# Patient Record
Sex: MALE | Race: WHITE | NOT HISPANIC OR LATINO | Employment: FULL TIME | URBAN - METROPOLITAN AREA
[De-identification: names, ages, dates, MRNs, and addresses within clinical notes are randomized per-mention and may not be internally consistent; named-entity substitution may affect disease eponyms.]

---

## 2017-01-24 ENCOUNTER — OFFICE VISIT (OUTPATIENT)
Dept: NEUROSURGERY | Facility: CLINIC | Age: 57
End: 2017-01-24
Payer: COMMERCIAL

## 2017-01-24 ENCOUNTER — HOSPITAL ENCOUNTER (OUTPATIENT)
Dept: RADIOLOGY | Facility: HOSPITAL | Age: 57
Discharge: HOME | End: 2017-01-24
Attending: NEUROLOGICAL SURGERY
Payer: COMMERCIAL

## 2017-01-24 ENCOUNTER — OFFICE VISIT (OUTPATIENT)
Dept: ENDOCRINOLOGY | Facility: CLINIC | Age: 57
End: 2017-01-24
Payer: COMMERCIAL

## 2017-01-24 VITALS
SYSTOLIC BLOOD PRESSURE: 140 MMHG | HEART RATE: 59 BPM | DIASTOLIC BLOOD PRESSURE: 85 MMHG | BODY MASS INDEX: 45.54 KG/M2 | HEIGHT: 67 IN | WEIGHT: 290.13 LBS

## 2017-01-24 VITALS
BODY MASS INDEX: 45.54 KG/M2 | HEIGHT: 67 IN | SYSTOLIC BLOOD PRESSURE: 140 MMHG | WEIGHT: 290.13 LBS | HEART RATE: 59 BPM | DIASTOLIC BLOOD PRESSURE: 85 MMHG

## 2017-01-24 DIAGNOSIS — D35.2 PITUITARY MACROADENOMA: ICD-10-CM

## 2017-01-24 DIAGNOSIS — E89.3 S/P TRANSSPHENOIDAL HYPOPHYSECTOMY: Primary | ICD-10-CM

## 2017-01-24 DIAGNOSIS — D35.2 PITUITARY MACROADENOMA WITH EXTRASELLAR EXTENSION: ICD-10-CM

## 2017-01-24 DIAGNOSIS — I10 ESSENTIAL HYPERTENSION: ICD-10-CM

## 2017-01-24 DIAGNOSIS — E66.9 OBESITY, UNSPECIFIED OBESITY SEVERITY, UNSPECIFIED OBESITY TYPE: ICD-10-CM

## 2017-01-24 DIAGNOSIS — D35.2 PITUITARY MACROADENOMA: Primary | ICD-10-CM

## 2017-01-24 DIAGNOSIS — E23.0 HYPOGONADOTROPIC HYPOGONADISM: ICD-10-CM

## 2017-01-24 PROCEDURE — 70553 MRI BRAIN STEM W/O & W/DYE: CPT | Mod: 26,,, | Performed by: RADIOLOGY

## 2017-01-24 PROCEDURE — 25500020 PHARM REV CODE 255: Performed by: NEUROLOGICAL SURGERY

## 2017-01-24 PROCEDURE — 70553 MRI BRAIN STEM W/O & W/DYE: CPT | Mod: TC

## 2017-01-24 PROCEDURE — 99999 PR PBB SHADOW E&M-EST. PATIENT-LVL III: CPT | Mod: PBBFAC,,, | Performed by: NEUROLOGICAL SURGERY

## 2017-01-24 PROCEDURE — 3079F DIAST BP 80-89 MM HG: CPT | Mod: S$GLB,,, | Performed by: INTERNAL MEDICINE

## 2017-01-24 PROCEDURE — 99999 PR PBB SHADOW E&M-EST. PATIENT-LVL III: CPT | Mod: PBBFAC,,, | Performed by: INTERNAL MEDICINE

## 2017-01-24 PROCEDURE — 99214 OFFICE O/P EST MOD 30 MIN: CPT | Mod: S$GLB,,, | Performed by: INTERNAL MEDICINE

## 2017-01-24 PROCEDURE — 1159F MED LIST DOCD IN RCRD: CPT | Mod: S$GLB,,, | Performed by: NEUROLOGICAL SURGERY

## 2017-01-24 PROCEDURE — 3077F SYST BP >= 140 MM HG: CPT | Mod: S$GLB,,, | Performed by: INTERNAL MEDICINE

## 2017-01-24 PROCEDURE — A9585 GADOBUTROL INJECTION: HCPCS | Performed by: NEUROLOGICAL SURGERY

## 2017-01-24 PROCEDURE — 99213 OFFICE O/P EST LOW 20 MIN: CPT | Mod: S$GLB,,, | Performed by: NEUROLOGICAL SURGERY

## 2017-01-24 PROCEDURE — 1159F MED LIST DOCD IN RCRD: CPT | Mod: S$GLB,,, | Performed by: INTERNAL MEDICINE

## 2017-01-24 RX ORDER — GADOBUTROL 604.72 MG/ML
5 INJECTION INTRAVENOUS
Status: COMPLETED | OUTPATIENT
Start: 2017-01-24 | End: 2017-01-24

## 2017-01-24 RX ADMIN — GADOBUTROL 5 ML: 604.72 INJECTION INTRAVENOUS at 10:01

## 2017-01-24 NOTE — PROGRESS NOTES
Subjective:      Patient ID: Paddy Wong is a 56 y.o. male.    Chief Complaint: f/u     History of Present Illness      Pt is a 55 yo male who presents today for F/U. Pt has known pituitary tumor that was discovered during a MRI of the brain. Pt states he received a workup after experiencing a common cold in May 2015 losing both of his senses of smell and taste. He then FU with an ENT physician regarding his symptoms. In July 2015, he again FU with an ENT who ordered a MRI of the brain and later referred him to a Neurologist. The Neurologist then referred him to a Neurosurgeon, Opthalmologist, and an Endocrinologist (Dr. Teixeira). In January 2016, the pt was still experiencing loss in smell and some taste senses when eating certain foods. Pt denies any past head trauma or visual disturbance.    He had surgery on 2/10/16    No polyuria/polydipsia/nocturia  No dizziness   Headache from sinuses  No nausea vomitnig    No h/o hypothyroidism  Weight gain of 25 lbs before surgery  No heat or cold intolerance  no constipation /no diarrhea  no anxiety/ no tremors   no blurry vision / no chest pain/ no palpitations.    Denies erectile dysfunction  Does not get morning erections but satisfied with sexual function    His MRI from outside showed pituitary tumor (1.267 cm x 1.07 cm) located in sella of brain near the olfactory nerve.     No hyper or hyposecretion from pituitary        Review of Systems   Constitutional: Negative for unexpected weight change.   Eyes: Negative for visual disturbance.   Respiratory: Negative for shortness of breath.    Cardiovascular: Negative for chest pain.   Gastrointestinal: Negative for abdominal pain.   Musculoskeletal: Negative for myalgias.   Skin: Negative for wound.   Neurological: Negative for headaches.   Hematological: Does not bruise/bleed easily.   Psychiatric/Behavioral: Positive for sleep disturbance (snores).       Objective:   Physical Exam   Constitutional: No distress.  "  Eyes: Right eye exhibits no discharge. Left eye exhibits no discharge.   Pulmonary/Chest: Effort normal. No respiratory distress.   Neurological: He is alert.   Skin: He is not diaphoretic.        Vitals:    01/24/17 1155   BP: (!) 140/85   Pulse: (!) 59   Weight: 131.6 kg (290 lb 2 oz)   Height: 5' 7" (1.702 m)       Lab Review:     FRom 's note outside MRI   MRI of brain (outside disc) shows pituitary tumor (1.267 cm x 1.07 cm) located in sella of brain near the olfactory nerve . All sinus cavities are open.    TSS : 2/11/16    FINAL PATHOLOGIC DIAGNOSIS  [facility] DIAGNOSIS:  PITUITARY, SLIDES FOR REVIEW ([id number]; [facility], [place], [place];  COLLECTED 2/11/2016):  PITUITARY ADENOMA WITH GONADOTROPHIC DIFFERENTIATION, IMMUNOREACTIVE  FOR ALPHA-SUBUNIT AND SF1     Assessment:     Mr.Joseph Wong is here for 3 month follow up. 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  Started life style modifications   Started exercise   encourage to continue exercise   Also improvement in diet  Cut down hill   Cut down alcohol     HTN uncontrolled  - weight loss  He is refusing to start medication   F/u with PCP  Plan:     Follow up:     Recheck blood work at 8 AM near his home       "

## 2017-01-24 NOTE — PROGRESS NOTES
Subjective:   I, Lara Mayer, am scribing for, and in the presence of, Dr. Cali Reyes.     Patient ID: Paddy Wong is a 56 y.o. male.    Chief Complaint: No chief complaint on file.    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 The patient states that he is doing well. He is back in the gym and is lifting weights and working 4 days per week. He denies any new complaints today.    Patient was seen in multidisciplinary clinic with Dr. Booth. See Dr. Booth's note.     Review of Systems   Constitutional: Negative for activity change, fatigue and fever.   HENT: Negative for facial swelling.    Eyes: Negative.    Respiratory: Negative.    Cardiovascular: Negative.    Gastrointestinal: Negative for diarrhea, nausea and vomiting.   Genitourinary: Negative.    Musculoskeletal: Negative for back pain, joint swelling and myalgias.   Neurological: Negative for seizures, weakness, numbness and headaches.   Psychiatric/Behavioral: Negative.        Past Medical History   Diagnosis Date    Arthritis     Hyperlipidemia     Obesity     Pituitary adenoma        Objective:      Physical Exam   Constitutional: He is oriented to person, place, and time. He appears well-developed and well-nourished.   HENT:   Head: Normocephalic and atraumatic.   Neck: Neck supple.   Neurological: He is alert and oriented to person, place, and time. No cranial nerve deficit. He displays a negative Romberg sign. GCS eye subscore is 4. GCS verbal subscore is 5. GCS motor subscore is 6.       Imaging:  MRI of the brain, dated 1/24/2017, shows resection of the pituitary tumor without compression of the optic chiasm.    I have personally reviewed the images with the pt.      I, Dr. Cali Reyes, personally performed the services described in this documentation as scribed by Lara Mayer in my presence, and it is both accurate and complete.  Assessment:       Pituitary  tumor.    Plan:   I have reviewed the MRI of the brain with the patient, which shows resection of the pituitary tumor without compression of the optic chiasm. Dr. Booth will continue to follow the patient's hormonal panel. I will schedule him follow up in 1 year with repeat MRI of the brain.

## 2017-01-24 NOTE — PATIENT INSTRUCTIONS
I have reviewed the MRI of the brain with the patient, which shows resection of the pituitary tumor without compression of the optic chiasm. Dr. Booth will continue to follow the patient's hormonal panel. I will schedule him follow up in 1 year with repeat MRI of the brain.

## 2017-01-30 ENCOUNTER — LAB VISIT (OUTPATIENT)
Dept: LAB | Facility: HOSPITAL | Age: 57
End: 2017-01-30
Attending: INTERNAL MEDICINE
Payer: COMMERCIAL

## 2017-01-30 DIAGNOSIS — D35.2 PITUITARY MACROADENOMA WITH EXTRASELLAR EXTENSION: ICD-10-CM

## 2017-01-30 DIAGNOSIS — R53.83 OTHER FATIGUE: ICD-10-CM

## 2017-01-30 DIAGNOSIS — D35.2 PITUITARY MACROADENOMA: ICD-10-CM

## 2017-01-30 DIAGNOSIS — E89.3 S/P TRANSSPHENOIDAL HYPOPHYSECTOMY: ICD-10-CM

## 2017-01-30 LAB
ALBUMIN SERPL BCP-MCNC: 3.6 G/DL
ALP SERPL-CCNC: 83 U/L
ALT SERPL W/O P-5'-P-CCNC: 33 U/L
ANION GAP SERPL CALC-SCNC: 9 MMOL/L
AST SERPL-CCNC: 35 U/L
BILIRUB SERPL-MCNC: 0.7 MG/DL
BUN SERPL-MCNC: 14 MG/DL
CALCIUM SERPL-MCNC: 9.2 MG/DL
CHLORIDE SERPL-SCNC: 105 MMOL/L
CO2 SERPL-SCNC: 25 MMOL/L
CORTIS SERPL-MCNC: 13.2 UG/DL
CREAT SERPL-MCNC: 1 MG/DL
EST. GFR  (AFRICAN AMERICAN): >60 ML/MIN/1.73 M^2
EST. GFR  (NON AFRICAN AMERICAN): >60 ML/MIN/1.73 M^2
FSH SERPL-ACNC: 2.2 MIU/ML
FSH SERPL-ACNC: 2.2 MIU/ML
GLUCOSE SERPL-MCNC: 104 MG/DL
LH SERPL-ACNC: 1.7 MIU/ML
LH SERPL-ACNC: 1.7 MIU/ML
POTASSIUM SERPL-SCNC: 5.1 MMOL/L
PROLACTIN SERPL IA-MCNC: 10.9 NG/ML
PROT SERPL-MCNC: 7.6 G/DL
SODIUM SERPL-SCNC: 139 MMOL/L
T4 FREE SERPL-MCNC: 0.76 NG/DL
TESTOST SERPL-MCNC: 272 NG/DL
TSH SERPL DL<=0.005 MIU/L-ACNC: 1.94 UIU/ML

## 2017-01-30 PROCEDURE — 84403 ASSAY OF TOTAL TESTOSTERONE: CPT | Mod: 91

## 2017-01-30 PROCEDURE — 84270 ASSAY OF SEX HORMONE GLOBUL: CPT

## 2017-01-30 PROCEDURE — 84439 ASSAY OF FREE THYROXINE: CPT

## 2017-01-30 PROCEDURE — 82024 ASSAY OF ACTH: CPT

## 2017-01-30 PROCEDURE — 84146 ASSAY OF PROLACTIN: CPT

## 2017-01-30 PROCEDURE — 83002 ASSAY OF GONADOTROPIN (LH): CPT

## 2017-01-30 PROCEDURE — 82533 TOTAL CORTISOL: CPT

## 2017-01-30 PROCEDURE — 80053 COMPREHEN METABOLIC PANEL: CPT

## 2017-01-30 PROCEDURE — 84305 ASSAY OF SOMATOMEDIN: CPT

## 2017-01-30 PROCEDURE — 83001 ASSAY OF GONADOTROPIN (FSH): CPT

## 2017-01-30 PROCEDURE — 84443 ASSAY THYROID STIM HORMONE: CPT

## 2017-01-30 PROCEDURE — 30000890 MAYO MISCELLANEOUS TEST (REFLEX)

## 2017-01-31 LAB — ACTH PLAS-MCNC: 29 PG/ML

## 2017-02-02 LAB — MAYO MISCELLANEOUS RESULT (REF): NORMAL

## 2017-02-03 ENCOUNTER — TELEPHONE (OUTPATIENT)
Dept: ENDOCRINOLOGY | Facility: CLINIC | Age: 57
End: 2017-02-03

## 2017-02-03 DIAGNOSIS — R79.89 LOW TESTOSTERONE: Primary | ICD-10-CM

## 2017-02-03 NOTE — TELEPHONE ENCOUNTER
----- Message from Ela Roberts sent at 2/3/2017  3:46 PM CST -----  David this is Ela from the send out lab. I'm contacting you about the testosterone panel test cancellation. The sample was not enough quantity to test. Please confirm you have reviewed this message. Thanks.

## 2017-02-07 ENCOUNTER — PATIENT MESSAGE (OUTPATIENT)
Dept: ENDOCRINOLOGY | Facility: CLINIC | Age: 57
End: 2017-02-07

## 2017-02-07 ENCOUNTER — TELEPHONE (OUTPATIENT)
Dept: ENDOCRINOLOGY | Facility: CLINIC | Age: 57
End: 2017-02-07

## 2017-02-07 NOTE — TELEPHONE ENCOUNTER
----- Message from Kaylynn Albert sent at 2/7/2017  9:47 AM CST -----  Contact: Bruce / Rolanda  [place]s [place] bruce at [phone] in regards to a test cancellation.

## 2017-06-13 ENCOUNTER — OFFICE VISIT (OUTPATIENT)
Dept: FAMILY MEDICINE | Facility: CLINIC | Age: 57
End: 2017-06-13
Payer: COMMERCIAL

## 2017-06-13 VITALS
BODY MASS INDEX: 44.47 KG/M2 | OXYGEN SATURATION: 97 % | WEIGHT: 293.44 LBS | SYSTOLIC BLOOD PRESSURE: 128 MMHG | DIASTOLIC BLOOD PRESSURE: 74 MMHG | HEART RATE: 77 BPM | HEIGHT: 68 IN

## 2017-06-13 DIAGNOSIS — K21.9 GASTROESOPHAGEAL REFLUX DISEASE, ESOPHAGITIS PRESENCE NOT SPECIFIED: ICD-10-CM

## 2017-06-13 DIAGNOSIS — R19.15 DECREASED BOWEL SOUNDS: ICD-10-CM

## 2017-06-13 DIAGNOSIS — R19.7 DIARRHEA, UNSPECIFIED TYPE: ICD-10-CM

## 2017-06-13 DIAGNOSIS — K44.9 HIATAL HERNIA: ICD-10-CM

## 2017-06-13 DIAGNOSIS — R10.13 ACUTE EPIGASTRIC PAIN: Primary | ICD-10-CM

## 2017-06-13 DIAGNOSIS — R30.0 DYSURIA: ICD-10-CM

## 2017-06-13 DIAGNOSIS — E66.01 MORBID OBESITY DUE TO EXCESS CALORIES: ICD-10-CM

## 2017-06-13 LAB
BILIRUB UR QL STRIP: NEGATIVE
CLARITY UR: CLEAR
COLOR UR: YELLOW
GLUCOSE UR QL STRIP: NEGATIVE
HGB UR QL STRIP: NEGATIVE
KETONES UR QL STRIP: NEGATIVE
LEUKOCYTE ESTERASE UR QL STRIP: NEGATIVE
NITRITE UR QL STRIP: NEGATIVE
PH UR STRIP: 6 [PH] (ref 5–8)
PROT UR QL STRIP: NEGATIVE
SP GR UR STRIP: 1.01 (ref 1–1.03)
URN SPEC COLLECT METH UR: ABNORMAL
UROBILINOGEN UR STRIP-ACNC: ABNORMAL EU/DL

## 2017-06-13 PROCEDURE — 99214 OFFICE O/P EST MOD 30 MIN: CPT | Mod: S$GLB,,, | Performed by: NURSE PRACTITIONER

## 2017-06-13 PROCEDURE — 87086 URINE CULTURE/COLONY COUNT: CPT

## 2017-06-13 PROCEDURE — 81002 URINALYSIS NONAUTO W/O SCOPE: CPT | Mod: PO

## 2017-06-13 PROCEDURE — 99999 PR PBB SHADOW E&M-EST. PATIENT-LVL IV: CPT | Mod: PBBFAC,,, | Performed by: NURSE PRACTITIONER

## 2017-06-13 NOTE — PROGRESS NOTES
Subjective:       Patient ID: Paddy Wong is a 56 y.o. male.    Chief Complaint: Abdominal Pain    Abdominal Pain   This is a new problem. The current episode started 1 to 4 weeks ago. The onset quality is undetermined. The problem occurs constantly. The problem has been unchanged. The pain is located in the epigastric region. The pain is at a severity of 3/10. The pain is mild. The quality of the pain is aching. The abdominal pain does not radiate. The pain is aggravated by certain positions. The pain is relieved by nothing. He has tried proton pump inhibitors and H2 blockers (Protonix, Nexium, Zantac) for the symptoms. The treatment provided no relief. His past medical history is significant for GERD.     Review of Systems   Gastrointestinal: Positive for abdominal pain.        Loose stools, acid reflux   All other systems reviewed and are negative.      Objective:      Physical Exam   Constitutional: He is oriented to person, place, and time. He appears well-developed. No distress.   Morbidly obese   HENT:   Head: Normocephalic and atraumatic.   Eyes: EOM are normal. Pupils are equal, round, and reactive to light.   Neck: Neck supple. No JVD present. No tracheal deviation present.   Cardiovascular: Normal rate, regular rhythm, normal heart sounds and intact distal pulses.    No murmur heard.  Pulmonary/Chest: Effort normal and breath sounds normal. No respiratory distress. He has no wheezes. He has no rales.   Abdominal: Soft. Normal appearance and normal aorta. He exhibits no distension and no mass. Bowel sounds are decreased. There is no tenderness. There is no rebound, no guarding, no CVA tenderness and no tenderness at McBurney's point.   Musculoskeletal: Normal range of motion. He exhibits no edema or tenderness.   Neurological: He is alert and oriented to person, place, and time. Coordination normal.   Skin: Skin is warm and dry. No erythema. No pallor.   Psychiatric: He has a normal mood and affect.  His behavior is normal. Judgment and thought content normal. Cognition and memory are normal. He expresses no homicidal and no suicidal ideation.   Nursing note and vitals reviewed.      Assessment:       1. Acute epigastric pain    2. Decreased bowel sounds    3. Diarrhea, unspecified type    4. Dysuria    5. Gastroesophageal reflux disease, esophagitis presence not specified    6. Hiatal hernia    7. Morbid obesity due to excess calories        Plan:     Paddy was seen today for abdominal pain.    Diagnoses and all orders for this visit:    Acute epigastric pain  -     US Abdomen Complete; Future    Decreased bowel sounds    Diarrhea, unspecified type    Dysuria  -     Urinalysis  -     Urine culture    Gastroesophageal reflux disease, esophagitis presence not specified    Hiatal hernia    Morbid obesity due to excess calories  Chronic, stable. Therapeutic lifestyle changes discussed. Followed by PCP.      Follow-up with PCP if symptoms worsen or fail to improve.

## 2017-06-14 ENCOUNTER — TELEPHONE (OUTPATIENT)
Dept: FAMILY MEDICINE | Facility: CLINIC | Age: 57
End: 2017-06-14

## 2017-06-14 ENCOUNTER — HOSPITAL ENCOUNTER (OUTPATIENT)
Dept: RADIOLOGY | Facility: HOSPITAL | Age: 57
Discharge: HOME | End: 2017-06-14
Attending: NURSE PRACTITIONER
Payer: COMMERCIAL

## 2017-06-14 ENCOUNTER — TELEPHONE (OUTPATIENT)
Dept: INTERNAL MEDICINE | Facility: CLINIC | Age: 57
End: 2017-06-14

## 2017-06-14 DIAGNOSIS — R10.13 ACUTE EPIGASTRIC PAIN: ICD-10-CM

## 2017-06-14 PROBLEM — K76.0 FATTY LIVER: Status: ACTIVE | Noted: 2017-06-14

## 2017-06-14 PROCEDURE — 76700 US EXAM ABDOM COMPLETE: CPT | Mod: 26,,, | Performed by: RADIOLOGY

## 2017-06-14 PROCEDURE — 76700 US EXAM ABDOM COMPLETE: CPT | Mod: TC

## 2017-06-14 NOTE — TELEPHONE ENCOUNTER
----- Message from Aditi Wells sent at 6/14/2017  8:20 AM CDT -----  Contact: self/[phone]  He is calling to see if his ultrasound has been scheduled for this morning; he cannot fast all day.

## 2017-06-15 LAB — BACTERIA UR CULT: NO GROWTH

## 2017-06-28 ENCOUNTER — OFFICE VISIT (OUTPATIENT)
Dept: SLEEP MEDICINE | Facility: CLINIC | Age: 57
End: 2017-06-28
Payer: COMMERCIAL

## 2017-06-28 VITALS
BODY MASS INDEX: 45.67 KG/M2 | DIASTOLIC BLOOD PRESSURE: 74 MMHG | HEIGHT: 67 IN | HEART RATE: 77 BPM | WEIGHT: 291 LBS | SYSTOLIC BLOOD PRESSURE: 128 MMHG

## 2017-06-28 DIAGNOSIS — G47.30 SLEEP APNEA, UNSPECIFIED TYPE: Primary | ICD-10-CM

## 2017-06-28 PROCEDURE — 99204 OFFICE O/P NEW MOD 45 MIN: CPT | Mod: S$GLB,,, | Performed by: PSYCHIATRY & NEUROLOGY

## 2017-06-28 PROCEDURE — 99999 PR PBB SHADOW E&M-EST. PATIENT-LVL III: CPT | Mod: PBBFAC,,, | Performed by: PSYCHIATRY & NEUROLOGY

## 2017-06-28 NOTE — LETTER
June 29, 2017      Carlota Booth MD  [address]  [facility] [address]           [facility]  [address] [facility] [address]  Phone: [phone]  Fax: [phone]          Patient: Paddy Wong   MR Number: [id number]   YOB: 1960   Date of Visit: 6/28/2017       Dear Dr. Carlota Booth:    Thank you for referring Paddy Wong to me for evaluation. Attached you will find relevant portions of my assessment and plan of care.    If you have questions, please do not hesitate to call me. I look forward to following Paddy Wong along with you.    Sincerely,    Ericka Chaves MD    Enclosure  CC:  No Recipients    If you would like to receive this communication electronically, please contact [email or web address] or [phone] to request more information on [employer] access.    For providers and/or their staff who would like to refer a patient to [facility], please contact us through our one-stop-shop provider referral line, [facility], [place], at [phone].    If you feel you have received this communication in error or would no longer like to receive these types of communications, please e-mail [email or web address]

## 2017-06-28 NOTE — PATIENT INSTRUCTIONS
SLEEP LAB ([facility]) will contact you to schedulethe sleep study. Their number is [phone] (ext 1). Please call them if you do not hear from them in 10 business days from now.  The [facility] Sleep Lab is located on 7th floor of the [facility]; [facility] is located in [facility].    SLEEP CLINIC (my assistant) will call you when the sleep study results are ready - if you have not heard from us by 2 weeks from the date of the study, please call [phone] (ext 2) or you can use [facility] to contact me.    You are advised to abstain from driving should you feel sleepy or drowsy.

## 2017-06-28 NOTE — PROGRESS NOTES
Paddy Wong  was seen at the request of  Carlota Booth MD for sleep evaluation.    06/28/2017 INITIAL HISTORY OF PRESENT ILLNESS:  Paddy Wong is a 56 y.o. male is here to be evaluated for a sleep disorder.       CHIEF COMPLAINT:      The patient's complaints include excessive daytime sleepiness, excessive daytime fatigue, snoring and interrupted sleep since  Several yeasrs ago.    Denies  dry mouth and sore throat  Reports occasional nasal congestion   Reports  morning headaches  Denies  interrupted sleep  Denies frequent leg movements  Denies symptoms concerning for parasomnia    The ESS ([place] Sleepiness Score) taken on initial visit is 5 /24    The patient never had tonsillectomy, adenoidectomy or UPPP      SLEEP ROUTINE AND LIFESTYLE 06/28/2017 :    Occupation:emergency response for [employer]    Bed partner:      Time to bed - wake up time on a workday : 11-12 to 6 AM  Time to bed - wake up time on a day off: 11-12 to  7 AM or 10  Sleep onset latency: 30 to 60 min  Disruptions or awakenings: at least 4 - sometimes with his fingers tingling ([id number] to sleeping on his side and stomach)  Time to fall back into sleep: soon   Perceived sleep quality: 2/5  Perceived total sleep time:  4-5  hours.  Daytime naps: 1    Exercise routine: +,but no longer playing ball  Caffeine: Not coffee; no ETOH at night     PREVIOUS SLEEP STUDIES:     none      DME:       PAST MEDICAL HISTORY:    Active Ambulatory Problems     Diagnosis Date Noted    Pituitary macroadenoma with extrasellar extension 12/08/2015    Pituitary macroadenoma 02/11/2016    S/P transsphenoidal hypophysectomy 01/24/2017    Hypogonadotropic hypogonadism 01/24/2017    Morbid obesity due to excess calories 01/24/2017    HTN (hypertension) 01/24/2017    Hiatal hernia 06/13/2017    GERD (gastroesophageal reflux disease) 06/13/2017    Fatty liver 06/14/2017     Resolved Ambulatory Problems     Diagnosis Date Noted    No Resolved Ambulatory  Problems     Past Medical History:   Diagnosis Date    Arthritis     HTN (hypertension) 1/24/2017    Hyperlipidemia     Obesity     Pituitary adenoma                 PAST SURGICAL HISTORY:    Past Surgical History:   Procedure Laterality Date    BRAIN SURGERY  2/11/16    PITUITARY TUMMOR RESECTION/WARE    FINGER AMPUTATION      KNEE ARTHROPLASTY      SHOULDER ARTHROSCOPY      TONSILLECTOMY           FAMILY HISTORY:                Family History   Problem Relation Age of Onset    Arthritis Mother     Diabetes Mother     Hypertension Mother     Arthritis Father     COPD Father     Diabetes Father     Hearing loss Father     Heart disease Father     Diabetes Sister     Hypertension Sister     Early death Brother     No Known Problems Maternal Aunt     No Known Problems Maternal Uncle     No Known Problems Paternal Aunt     No Known Problems Paternal Uncle     No Known Problems Maternal Grandmother     No Known Problems Maternal Grandfather     No Known Problems Paternal Grandmother     No Known Problems Paternal Grandfather     Amblyopia Neg Hx     Blindness Neg Hx     Cancer Neg Hx     Cataracts Neg Hx     Glaucoma Neg Hx     Macular degeneration Neg Hx     Retinal detachment Neg Hx     Strabismus Neg Hx     Stroke Neg Hx     Thyroid disease Neg Hx        SOCIAL HISTORY:          Tobacco:   History   Smoking Status    Light Tobacco Smoker    Packs/day: 0.25    Years: 15.00   Smokeless Tobacco    Not on file       alcohol use:    History   Alcohol Use    1.8 oz/week    1 Glasses of wine, 1 Cans of beer, 1 Shots of liquor per week                   ALLERGIES:    Review of patient's allergies indicates:   Allergen Reactions    Morphine Nausea And Vomiting       CURRENT MEDICATIONS:    Current Outpatient Prescriptions   Medication Sig Dispense Refill    pantoprazole (PROTONIX) 40 MG tablet Take 1 tablet (40 mg total) by mouth once daily. 30 tablet 3     No current  "facility-administered medications for this visit.                       REVIEW OF SYSTEMS:   Sleep related symptoms as per HPI    reports weight gain since 2011  Denies dyspnea  Denies palpitations  Reports occasional acid reflux   Denies polyuria x 1  Denies  mood diturbance  Denies  anemia  Denies  muscle pain  Denies  Gait imbalance    Otherwise, a balance of 10 systems reviewed is negative.    PHYSICAL EXAM:  Ht 5' 7" (1.702 m)   Wt 132 kg (291 lb 0.1 oz)   BMI 45.58 kg/m²   GENERAL: Overweight body habitus, well groomed.  HEENT:   HEENT:  Conjunctivae are non-erythematous; Pupils equal, round, and reactive to light; Nose is symmetrical; Nasal mucosa is pink and moist; Septum is midline; Inferior turbinates are hypertrophied; Nasal airflow is diminshed; Posterior pharynx is pink; Modified Mallampati:III-IV; Posterior palate is low; Tonsils not visualized; Uvula is normal and pink;Tongue is enlarged; Dentition is fair; No TMJ tenderness; Jaw opening and protrusion without click and without discomfort.  NECK: Supple. Neck circumference is 20 inches. No thyromegaly. No palpable nodes.     SKIN: On face and neck: No abrasions, no rashes, no lesions.  No subcutaneous nodules are palpable.  RESPIRATORY: Chest is clear to auscultation.  Normal chest expansion and non-labored breathing at rest.  CARDIOVASCULAR: Normal S1, S2.  No murmurs, gallops or rubs. No carotid bruits bilaterally.  No edema. No clubbing. No cyanosis.    NEURO: Oriented to time, place and person. Normal attention span and concentration. Gait normal.    PSYCH: Affect is full. Mood is normal  MUSCULOSKELETAL: Moves 4 extremities. Gait normal.         Using My [facility]:       ASSESSMENT:    1. Sleep Apnea NEC. The patient symptomatically has  excessive daytime sleepiness, snoring, excessive daytime fatigue and interrupted sleep  with exam findings of "a crowded oral airway and elevated body mass index. The patient has medical co-morbidities of " hypertension,  which can be worsened by LILIYA. This warrants further investigation for possible obstructive sleep apnea.          PLAN:    Diagnostic: Polysomnogram in lab (BMI over 45 - risk for obesity hypoventilation). The nature of this procedure and its indication was discussed with the patient. he would  like to come discuss PSG results.    Weight loss strategies were discussed in detail       More than 25 minutes of this 45 minutes visit was spent in counseling: during our discussion today, we talked about the etiology of  LILIYA as well as the potential ramifications of untreated sleep apnea, which could include daytime sleepiness, hypertension, heart disease and/or stroke.  We discussed potential treatment options, which could include weight loss, body positioning, continuous positive airway pressure (CPAP), or referral for surgical consideration. Meanwhile, he  is urged to avoid supine sleep, weight gain and alcoholic beverages since all of these can worsen LILIYA.     Precautions: The patient was advised to abstain from driving should he feel sleepy or drowsy.    Follow up: MD/NP  after the sleep study has been completed.     Thank you for allowing me the opportunity to participate in the care of your patient.    This visit summary will be sent to referring provider via inbasket

## 2017-07-05 ENCOUNTER — TELEPHONE (OUTPATIENT)
Dept: SLEEP MEDICINE | Facility: CLINIC | Age: 57
End: 2017-07-05

## 2017-07-28 ENCOUNTER — TELEPHONE (OUTPATIENT)
Dept: SLEEP MEDICINE | Facility: CLINIC | Age: 57
End: 2017-07-28

## 2017-07-28 DIAGNOSIS — G47.30 SLEEP APNEA, UNSPECIFIED TYPE: Primary | ICD-10-CM

## 2017-07-28 NOTE — TELEPHONE ENCOUNTER
Joselyn,    Please inform the patient of the following:      Thanks!    Unfortunately, your insurance denied in lab sleep study.  Please see the letter below.  We will have to go to plan B and order a Home Study for you.  Please keep in mind that it is far inferior to in lab in terms of sensitivity, and negative home study does not mean that you do not have Sleep Apnea Disorder.    SLEEP LAB ( [facility]) will [place]. Their number is [phone] (ext 1). Please call them if you do not hear from them in 10 business days from now.  The [facility] Sleep Lab is located on 7th floor of the [facility] where you can  home study device.    SLEEP CLINIC (my assistant) will call you when the sleep study results are ready - if you have not heard from us by 2 weeks from the date of the study, please call [phone] (ext 2) or you can use [facility] to contact me.    You are advised to abstain from driving should you feel sleepy or drowsy.  ----------------------------------------------------------------------------------------------------                  ---------------------------------------------  Dr. Ericka Chaves,     Thank you for ordering   SLE3 - POLYSOMNOGRAM for patient Paddy Wong, MRN [id number]. Unfortunately, the patient's insurance, [employer], has denied the service due to Other, N/A. This is an automated In Basket notification that does not require a reply. For a more detailed explanation, or for questions regarding this insurance denial, send an In Basket message to the Pre Service Intake pool or call the [facility] Pre-Service department at [phone] and reference referral ID [id number].The Pre-Service department hours are M-F 8 a.m. to 5 p.m.       Thank you,     [facility]

## 2017-08-01 ENCOUNTER — TELEPHONE (OUTPATIENT)
Dept: SLEEP MEDICINE | Facility: OTHER | Age: 57
End: 2017-08-01

## 2017-09-18 ENCOUNTER — TELEPHONE (OUTPATIENT)
Dept: SLEEP MEDICINE | Facility: OTHER | Age: 57
End: 2017-09-18

## 2017-10-18 ENCOUNTER — TELEPHONE (OUTPATIENT)
Dept: SLEEP MEDICINE | Facility: OTHER | Age: 57
End: 2017-10-18

## 2017-11-06 RX ORDER — PANTOPRAZOLE SODIUM 40 MG/1
40 TABLET, DELAYED RELEASE ORAL DAILY
Qty: 30 TABLET | Refills: 2 | OUTPATIENT
Start: 2017-11-06

## 2017-11-13 ENCOUNTER — TELEPHONE (OUTPATIENT)
Dept: SLEEP MEDICINE | Facility: OTHER | Age: 57
End: 2017-11-13

## 2018-06-11 ENCOUNTER — TELEPHONE (OUTPATIENT)
Dept: NEUROSURGERY | Facility: CLINIC | Age: 58
End: 2018-06-11

## 2018-06-11 ENCOUNTER — PATIENT MESSAGE (OUTPATIENT)
Dept: NEUROSURGERY | Facility: CLINIC | Age: 58
End: 2018-06-11

## 2018-06-11 DIAGNOSIS — D35.2 PITUITARY ADENOMA: Primary | ICD-10-CM

## 2018-06-21 ENCOUNTER — OFFICE VISIT (OUTPATIENT)
Dept: ENDOCRINOLOGY | Facility: CLINIC | Age: 58
End: 2018-06-21
Payer: COMMERCIAL

## 2018-06-21 VITALS
WEIGHT: 308.19 LBS | DIASTOLIC BLOOD PRESSURE: 80 MMHG | HEART RATE: 84 BPM | HEIGHT: 67 IN | SYSTOLIC BLOOD PRESSURE: 124 MMHG | BODY MASS INDEX: 48.37 KG/M2

## 2018-06-21 DIAGNOSIS — H53.8 BLURRY VISION, RIGHT EYE: ICD-10-CM

## 2018-06-21 DIAGNOSIS — D35.2 PITUITARY MACROADENOMA: Primary | ICD-10-CM

## 2018-06-21 DIAGNOSIS — E66.01 MORBID OBESITY WITH BMI OF 45.0-49.9, ADULT: ICD-10-CM

## 2018-06-21 DIAGNOSIS — R53.82 CHRONIC FATIGUE: ICD-10-CM

## 2018-06-21 PROCEDURE — 3074F SYST BP LT 130 MM HG: CPT | Mod: CPTII,S$GLB,, | Performed by: INTERNAL MEDICINE

## 2018-06-21 PROCEDURE — 99999 PR PBB SHADOW E&M-EST. PATIENT-LVL IV: CPT | Mod: PBBFAC,,, | Performed by: INTERNAL MEDICINE

## 2018-06-21 PROCEDURE — 3008F BODY MASS INDEX DOCD: CPT | Mod: CPTII,S$GLB,, | Performed by: INTERNAL MEDICINE

## 2018-06-21 PROCEDURE — 3079F DIAST BP 80-89 MM HG: CPT | Mod: CPTII,S$GLB,, | Performed by: INTERNAL MEDICINE

## 2018-06-21 PROCEDURE — 99214 OFFICE O/P EST MOD 30 MIN: CPT | Mod: S$GLB,,, | Performed by: INTERNAL MEDICINE

## 2018-06-21 RX ORDER — PANTOPRAZOLE SODIUM 40 MG/1
40 TABLET, DELAYED RELEASE ORAL DAILY
Qty: 30 TABLET | Refills: 3 | Status: CANCELLED | OUTPATIENT
Start: 2018-06-21

## 2018-06-21 NOTE — PROGRESS NOTES
"Subjective:      Patient ID: Paddy Wong is a 57 y.o. male.    Chief Complaint: follow-up of pituitary macroadenoma   is presenting for follow up of pituitary macroadenoma.    Former patient of Dr. Booth for pituitary macroadenoma.    Transphenoidal surgery in 2/2016 which demonstrated gonadotrope produing adenoma.  Last mri brain from 1/2017  :  There are postoperative changes consistent with prior transphenoidal approach resection of a pituitary lesion.  There is residual enhancement along the floor of the sella measuring approximately 4 mm in thickness, which may represent residual pituitary parenchyma or adenoma. No new focal nodular growth.  The infundibulum is slightly leftward deviated. No significant suprasellar mass effect  The optic chiasm is within normal limits.  There is continued complete opacification of the left sphenoid sinus which is heterogenous in signal.  Limited examination of the brain demonstrates no focal parenchymal abnormality.  Ventricles are stable in size without evidence for hydrocephalus.  No midline shift or mass effect.  Osseous marrow signal is within normal limits.    Current symptoms including fatigue, right blurry eye vision for 3 weeks which has not been progressive, and weight gain.  Denies peripheral field deficits  Had previously seen Dr. Moseley with Opthalmology     Weight gain - at least 10lbs in several years. bmi at 48    Review of Systems   Constitutional: Positive for fatigue and unexpected weight change.   Eyes: Positive for visual disturbance.   Respiratory: Negative for shortness of breath.    Cardiovascular: Negative for chest pain.   Gastrointestinal: Negative for abdominal pain.   Musculoskeletal: Negative for myalgias.   Skin: Negative for wound.   Neurological: Negative for headaches.   Hematological: Does not bruise/bleed easily.   Psychiatric/Behavioral: Negative for sleep disturbance.       Objective:     /80   Pulse 84   Ht 5' 7" " (1.702 m)   Wt (!) 139.8 kg (308 lb 3.3 oz)   BMI 48.27 kg/m²      Physical Exam   Neck: No thyromegaly present.   Cardiovascular: Normal rate.    Pulmonary/Chest: Effort normal.   Abdominal: Soft.   Musculoskeletal: He exhibits no edema.   Vitals reviewed.      Assessment:     1. Pituitary macroadenoma    2. Morbid obesity with BMI of 45.0-49.9, adult    3. Blurry vision, right eye    4. Chronic fatigue        Plan:   1. Repeat hormonal evaluation and MRI scheduled in the next 2 weeks.  2. Referral to bariatric medicine. Would be interested eventually in weight loss surgery if that was an option financially.  3. Referral to opthalmology.  4. Will evaluate for hypogonadism, hypothyroidism, and adrenal insufficiency. If work-up is unremarkable, would recommend evaluation for sleep apnea.  Follow-up in about 1 year (around 6/21/2019).    Discussed with Dr. Rosa Felix MD

## 2018-06-26 ENCOUNTER — LAB VISIT (OUTPATIENT)
Dept: LAB | Facility: HOSPITAL | Age: 58
End: 2018-06-26
Attending: NEUROLOGICAL SURGERY
Payer: COMMERCIAL

## 2018-06-26 DIAGNOSIS — E66.01 MORBID OBESITY WITH BMI OF 45.0-49.9, ADULT: ICD-10-CM

## 2018-06-26 DIAGNOSIS — D35.2 PITUITARY ADENOMA: ICD-10-CM

## 2018-06-26 DIAGNOSIS — D35.2 PITUITARY MACROADENOMA: ICD-10-CM

## 2018-06-26 LAB
ALBUMIN SERPL BCP-MCNC: 3.9 G/DL
ALP SERPL-CCNC: 88 U/L
ALT SERPL W/O P-5'-P-CCNC: 41 U/L
ANION GAP SERPL CALC-SCNC: 10 MMOL/L
AST SERPL-CCNC: 29 U/L
BILIRUB SERPL-MCNC: 0.9 MG/DL
BUN SERPL-MCNC: 12 MG/DL
CALCIUM SERPL-MCNC: 9.6 MG/DL
CHLORIDE SERPL-SCNC: 108 MMOL/L
CO2 SERPL-SCNC: 25 MMOL/L
CORTIS SERPL-MCNC: 7 UG/DL
CREAT SERPL-MCNC: 0.9 MG/DL
EST. GFR  (AFRICAN AMERICAN): >60 ML/MIN/1.73 M^2
EST. GFR  (NON AFRICAN AMERICAN): >60 ML/MIN/1.73 M^2
ESTIMATED AVG GLUCOSE: 103 MG/DL
FSH SERPL-ACNC: 2.2 MIU/ML
GLUCOSE SERPL-MCNC: 108 MG/DL
HBA1C MFR BLD HPLC: 5.2 %
LH SERPL-ACNC: 1.9 MIU/ML
POTASSIUM SERPL-SCNC: 4.3 MMOL/L
PROLACTIN SERPL IA-MCNC: 8.4 NG/ML
PROT SERPL-MCNC: 7.3 G/DL
SODIUM SERPL-SCNC: 143 MMOL/L
T4 FREE SERPL-MCNC: 0.78 NG/DL
TESTOST SERPL-MCNC: 256 NG/DL
TSH SERPL DL<=0.005 MIU/L-ACNC: 1.85 UIU/ML

## 2018-06-26 PROCEDURE — 84439 ASSAY OF FREE THYROXINE: CPT

## 2018-06-26 PROCEDURE — 82533 TOTAL CORTISOL: CPT

## 2018-06-26 PROCEDURE — 83002 ASSAY OF GONADOTROPIN (LH): CPT

## 2018-06-26 PROCEDURE — 82024 ASSAY OF ACTH: CPT

## 2018-06-26 PROCEDURE — 84146 ASSAY OF PROLACTIN: CPT

## 2018-06-26 PROCEDURE — 80053 COMPREHEN METABOLIC PANEL: CPT

## 2018-06-26 PROCEDURE — 84443 ASSAY THYROID STIM HORMONE: CPT

## 2018-06-26 PROCEDURE — 83001 ASSAY OF GONADOTROPIN (FSH): CPT

## 2018-06-26 PROCEDURE — 84305 ASSAY OF SOMATOMEDIN: CPT

## 2018-06-26 PROCEDURE — 83036 HEMOGLOBIN GLYCOSYLATED A1C: CPT

## 2018-06-26 PROCEDURE — 84403 ASSAY OF TOTAL TESTOSTERONE: CPT

## 2018-06-28 ENCOUNTER — INITIAL CONSULT (OUTPATIENT)
Dept: OPTOMETRY | Facility: CLINIC | Age: 58
End: 2018-06-28
Payer: COMMERCIAL

## 2018-06-28 DIAGNOSIS — H52.4 PRESBYOPIA: ICD-10-CM

## 2018-06-28 DIAGNOSIS — H52.221 REGULAR ASTIGMATISM OF RIGHT EYE: ICD-10-CM

## 2018-06-28 DIAGNOSIS — D35.2 PITUITARY MACROADENOMA: ICD-10-CM

## 2018-06-28 DIAGNOSIS — I10 ESSENTIAL HYPERTENSION: Primary | ICD-10-CM

## 2018-06-28 DIAGNOSIS — H25.13 NS (NUCLEAR SCLEROSIS), BILATERAL: ICD-10-CM

## 2018-06-28 LAB
IGF-I SERPL-MCNC: 94 NG/ML (ref 34–232)
IGF-I Z-SCORE SERPL: -0.52 SD

## 2018-06-28 PROCEDURE — 99999 PR PBB SHADOW E&M-EST. PATIENT-LVL II: CPT | Mod: PBBFAC,,, | Performed by: OPTOMETRIST

## 2018-06-28 PROCEDURE — 92014 COMPRE OPH EXAM EST PT 1/>: CPT | Mod: S$GLB,,, | Performed by: OPTOMETRIST

## 2018-06-28 PROCEDURE — 92015 DETERMINE REFRACTIVE STATE: CPT | Mod: S$GLB,,, | Performed by: OPTOMETRIST

## 2018-06-28 NOTE — PROGRESS NOTES
"HPI     57yr old male present for consult due to decreased va. Pt diagnosed with   Pituitary adenoma just recently. Pt states he notice his right eye is   cloudy x 1 month. Pt right eye will start to shake (like a cramp) for a   second "out the blue", it happens when watching TV in the dark on   occasions.      Last edited by Tevin Malhotra on 6/28/2018  1:38 PM. (History)            Assessment /Plan     For exam results, see Encounter Report.    Essential hypertension   No retinopathy, monitor yearly    Pituitary macroadenoma  -     Mcneal Visual Field - OU - scheduled for July 3    NS (nuclear sclerosis), bilateral   MIld, monitor, not visually significant    Regular astigmatism of right eye  Presbyopia   Rx specs    RTC 1 year, sooner PRN                 "

## 2018-06-28 NOTE — LETTER
June 28, 2018      Alfredo Felix MD  [address]  [facility] [address]           [facility] Opt[facility]  [address]  [facility] [address]  Phone: [phone]  Fax: [phone]          Patient: Paddy Wong   MR Number: [id number]   YOB: 1960   Date of Visit: 6/28/2018       Dear Dr. Alfredo Felix:    Thank you for referring Paddy Wong to me for evaluation. Attached you will find relevant portions of my assessment and plan of care.    If you have questions, please do not hesitate to call me. I look forward to following Paddy Wong along with you.    Sincerely,    Xochilt Padilla, OD    Enclosure  CC:  No Recipients    If you would like to receive this communication electronically, please contact [email or web address] or [phone] to request more information on [employer] access.    For providers and/or their staff who would like to refer a patient to [facility], please contact us through our one-stop-shop provider referral line, [facility], at [phone].    If you feel you have received this communication in error or would no longer like to receive these types of communications, please e-mail [email or web address]

## 2018-06-29 LAB — ACTH PLAS-MCNC: 30 PG/ML

## 2018-07-03 ENCOUNTER — HOSPITAL ENCOUNTER (OUTPATIENT)
Dept: RADIOLOGY | Facility: HOSPITAL | Age: 58
Discharge: HOME | End: 2018-07-03
Attending: NEUROLOGICAL SURGERY
Payer: COMMERCIAL

## 2018-07-03 ENCOUNTER — OFFICE VISIT (OUTPATIENT)
Dept: NEUROSURGERY | Facility: CLINIC | Age: 58
End: 2018-07-03
Payer: COMMERCIAL

## 2018-07-03 ENCOUNTER — CLINICAL SUPPORT (OUTPATIENT)
Dept: OPHTHALMOLOGY | Facility: CLINIC | Age: 58
End: 2018-07-03
Payer: COMMERCIAL

## 2018-07-03 VITALS
HEIGHT: 68 IN | DIASTOLIC BLOOD PRESSURE: 88 MMHG | WEIGHT: 302.69 LBS | BODY MASS INDEX: 45.88 KG/M2 | SYSTOLIC BLOOD PRESSURE: 142 MMHG | TEMPERATURE: 99 F | HEART RATE: 72 BPM

## 2018-07-03 DIAGNOSIS — D35.2 PITUITARY ADENOMA: Primary | ICD-10-CM

## 2018-07-03 DIAGNOSIS — E23.0 HYPOGONADOTROPIC HYPOGONADISM: ICD-10-CM

## 2018-07-03 DIAGNOSIS — D35.2 PITUITARY MACROADENOMA: ICD-10-CM

## 2018-07-03 DIAGNOSIS — D35.2 PITUITARY ADENOMA: ICD-10-CM

## 2018-07-03 PROCEDURE — 99214 OFFICE O/P EST MOD 30 MIN: CPT | Mod: S$GLB,,, | Performed by: NEUROLOGICAL SURGERY

## 2018-07-03 PROCEDURE — 92083 EXTENDED VISUAL FIELD XM: CPT | Mod: S$GLB,,, | Performed by: OPTOMETRIST

## 2018-07-03 PROCEDURE — 70553 MRI BRAIN STEM W/O & W/DYE: CPT | Mod: TC

## 2018-07-03 PROCEDURE — 70553 MRI BRAIN STEM W/O & W/DYE: CPT | Mod: 26,,, | Performed by: RADIOLOGY

## 2018-07-03 PROCEDURE — 3079F DIAST BP 80-89 MM HG: CPT | Mod: CPTII,S$GLB,, | Performed by: NEUROLOGICAL SURGERY

## 2018-07-03 PROCEDURE — 25500020 PHARM REV CODE 255: Performed by: NEUROLOGICAL SURGERY

## 2018-07-03 PROCEDURE — 3077F SYST BP >= 140 MM HG: CPT | Mod: CPTII,S$GLB,, | Performed by: NEUROLOGICAL SURGERY

## 2018-07-03 PROCEDURE — A9585 GADOBUTROL INJECTION: HCPCS | Performed by: NEUROLOGICAL SURGERY

## 2018-07-03 PROCEDURE — 3008F BODY MASS INDEX DOCD: CPT | Mod: CPTII,S$GLB,, | Performed by: NEUROLOGICAL SURGERY

## 2018-07-03 PROCEDURE — 99999 PR PBB SHADOW E&M-EST. PATIENT-LVL III: CPT | Mod: PBBFAC,,, | Performed by: NEUROLOGICAL SURGERY

## 2018-07-03 RX ORDER — GADOBUTROL 604.72 MG/ML
5 INJECTION INTRAVENOUS
Status: COMPLETED | OUTPATIENT
Start: 2018-07-03 | End: 2018-07-03

## 2018-07-03 RX ADMIN — GADOBUTROL 5 ML: 604.72 INJECTION INTRAVENOUS at 09:07

## 2018-07-03 NOTE — PATIENT INSTRUCTIONS
I have personally reviewed the MRI Brain with the pt which shows stable postoperative changes from transphenoidal hypophysectomy of pituitary macroadenoma. There is proper decompression of the optic chiasm. I have also reviewed the pt's labs which were all unremarkable.    I advised pt to receive a sleep study.    I will schedule the patient for 2 year follow up with MRI Brain.

## 2018-07-03 NOTE — PROGRESS NOTES
Subjective:   I, Brian Pena, attest that this documentation has been prepared under the direction and in the presence of Cali Reyes MD.     Patient ID: Paddy Wong is a 57 y.o. male     Chief Complaint: Follow-up      HPI  The patient is a 57 y.o. male with pituitary macroadenoma, s/p  transphenoidal hypophysectomy of pituitary tumor (02/11/2016) who presents today for follow up. At the time of the last office visit on 01/24/2017, the pt was doing well without acute complaints.     Today, the pt reports constant fatigue and a decrease in his energy levels. The pt was recently worked up by endocrinology with blood work which was normal. The pt states he sleeps for 4-5 hours a nigh and snores when doing so. Pt was due to receive a sleep study per sleep medicine, Dr. Chaves, which he never received due to insurance complications.    Review of Systems   Constitutional: Positive for activity change (decreased) and fatigue. Negative for appetite change, fever and unexpected weight change.   HENT: Negative for facial swelling.    Eyes: Negative.    Respiratory: Negative.    Cardiovascular: Negative.    Gastrointestinal: Negative for diarrhea, nausea and vomiting.   Endocrine: Negative.    Genitourinary: Negative.    Musculoskeletal: Negative for back pain, joint swelling, myalgias and neck pain.   Neurological: Negative for dizziness, weakness, numbness and headaches.   Psychiatric/Behavioral: Positive for sleep disturbance (decreased).      Past Medical History:   Diagnosis Date    Arthritis     HTN (hypertension) 1/24/2017    Hyperlipidemia     Obesity     Pituitary adenoma        Objective:      Vitals:    07/03/18 1113   BP: (!) 142/88   Pulse: 72   Temp: 98.7 °F (37.1 °C)      Physical Exam   Constitutional: He is oriented to person, place, and time. He appears well-nourished.   HENT:   Head: Normocephalic and atraumatic.   Neck: Neck supple.   Neurological: He is alert and oriented to person, place,  and time. No cranial nerve deficit. He displays a negative Romberg sign. GCS eye subscore is 4. GCS verbal subscore is 5. GCS motor subscore is 6.        IMAGING:  MRI Brain W WO Contrast (07/03/2018) shows stable postoperative changes from transphenoidal hypophysectomy of pituitary macroadenoma. There is proper decompression of the optic chiasm.     PITUITARY LABS(06/26/2018)  Prolactin: within normal range at 8.4  Labs otherwise unremarkable.      I have personally reviewed the images with the pt.      I, Dr. Cali Reyes, personally performed the services described in this documentation. All medical record entries made by the scribe, Brian Pena, were at my direction and in my presence.  I have reviewed the chart and agree that the record reflects my personal performance and is accurate and complete. Cali Reyes MD.  11:27 AM 07/03/2018    Assessment:       Pituitary adenoma.     Plan:   I have personally reviewed the MRI Brain with the pt which shows stable postoperative changes from transphenoidal hypophysectomy of pituitary macroadenoma. There is proper decompression of the optic chiasm. I have also reviewed the pt's labs which were all unremarkable.    I advised pt to receive a sleep study.    I will schedule the patient for 2 year follow up with MRI Brain.

## 2018-09-05 ENCOUNTER — PATIENT MESSAGE (OUTPATIENT)
Dept: BARIATRICS | Facility: CLINIC | Age: 58
End: 2018-09-05

## 2018-09-06 ENCOUNTER — INITIAL CONSULT (OUTPATIENT)
Dept: BARIATRICS | Facility: CLINIC | Age: 58
End: 2018-09-06
Payer: COMMERCIAL

## 2018-09-06 VITALS
HEART RATE: 76 BPM | HEIGHT: 68 IN | BODY MASS INDEX: 46.21 KG/M2 | SYSTOLIC BLOOD PRESSURE: 130 MMHG | DIASTOLIC BLOOD PRESSURE: 66 MMHG | WEIGHT: 304.88 LBS

## 2018-09-06 DIAGNOSIS — K76.0 FATTY LIVER: ICD-10-CM

## 2018-09-06 DIAGNOSIS — R73.01 IFG (IMPAIRED FASTING GLUCOSE): ICD-10-CM

## 2018-09-06 DIAGNOSIS — R06.81 WITNESSED EPISODE OF APNEA: ICD-10-CM

## 2018-09-06 DIAGNOSIS — E66.01 CLASS 3 SEVERE OBESITY DUE TO EXCESS CALORIES WITH SERIOUS COMORBIDITY AND BODY MASS INDEX (BMI) OF 45.0 TO 49.9 IN ADULT: Primary | ICD-10-CM

## 2018-09-06 DIAGNOSIS — K21.9 GASTROESOPHAGEAL REFLUX DISEASE, ESOPHAGITIS PRESENCE NOT SPECIFIED: ICD-10-CM

## 2018-09-06 DIAGNOSIS — I10 ESSENTIAL HYPERTENSION: ICD-10-CM

## 2018-09-06 PROCEDURE — 99999 PR PBB SHADOW E&M-EST. PATIENT-LVL IV: CPT | Mod: PBBFAC,,, | Performed by: INTERNAL MEDICINE

## 2018-09-06 PROCEDURE — 99244 OFF/OP CNSLTJ NEW/EST MOD 40: CPT | Mod: S$GLB,,, | Performed by: INTERNAL MEDICINE

## 2018-09-06 RX ORDER — PHENTERMINE HYDROCHLORIDE 37.5 MG/1
37.5 TABLET ORAL
Qty: 30 TABLET | Refills: 2 | Status: SHIPPED | OUTPATIENT
Start: 2018-09-06 | End: 2018-10-06

## 2018-09-06 NOTE — Clinical Note
Hi, Pt saw you and did not move forward with home sleep study. Discussed with him that he should get eval'ed. We did not schedule him, as I wasn't sure how you would like to handle it. Thanks, Maria Guadalupe

## 2018-09-06 NOTE — LETTER
September 13, 2018      Domingo Lee MD  [address]  [facility] [address]       [facility] - Bariatric [facility]  [address]  [facility] [address]  Phone: [phone]  Fax: [phone]   Patient: Paddy Wong   MR Number: [id number]   YOB: 1960   Date of Visit: 9/6/2018     Dear Dr. Lee:    Thank you for referring Paddy Wong to me for evaluation. Below are the relevant portions of my assessment and plan of care.    ASSESSMENT:  1. Class 3 severe obesity due to excess calories with serious comorbidity and body mass index (BMI) of 45.0 to 49.9 in adult    2. Witnessed episode of apnea    3. Fatty liver    4. Gastroesophageal reflux disease, esophagitis presence not specified    5. Essential hypertension    6. IFG (impaired fasting glucose)      PLAN:   1. Class 3 severe obesity due to excess calories with serious comorbidity and body mass index (BMI) of 45.0 to 49.9 in adult     - Phentermine (ADIPEX-P) 37.5 mg tablet; Take 1 tablet (37.5 mg total) by mouth before breakfast.  Dispense: 30 tablet; Refill: 2  Patient warned of common side effects of phentermine including anxiety, insomnia, palpitations and increased blood pressure. It was also explained that it is for short-term usage along with diet and exercise, and that stopping the medication without making lifestyle changes will result in regain of weight. Patient states understanding.      Weight loss medications are controlled substances.  They require routine follow up. Prescription or pills that are lost or destroyed will not be replaced.     Start Phentermine with 1/2 pill a day for at least 1 week to see if that will control your appetite.  Go up to a full pill when needed.         Limit alcohol to 4 drinks per week.       Exercise 30 min 3 days a week. Gradually increase.      Patient counseled in strategies for long term weight loss and maintenance: Keeping a food diary, exercise for 1 hour a day and eating  breakfast everyday.      3 meals a day made up of the following:  Unlimited green vegetables, tomatoes, mushrooms, spaghetti squash, cauliflower, meat, poultry, seafood, eggs and hard cheeses.   Milk and plain yogurt  Dressings, seasonings, condiments, etc should have less than 2 g sugars.   Beans (1-1.5 cups) or nuts (1/4 cup) can have 1 x a day.   1-2 servings of citrus fruits, berries, pineapple or melon a day (1/2 cup)  Avoid fried foods     No grains, rice, pasta, potatoes, bread, corn, peas, oatmeal, grits, tortillas, crackers, chips, no soda, sweet tea, juices or lemonade.     [email or web address] for recipes. Moderate carb intake     Meal ideas given.     2. Witnessed episode of apnea  Will message Dr. Chaves as to how she would prefer to get this patient back to care.   - Ambulatory consult to Sleep Disorders     3. Fatty liver  Discussed with patient that the only treatment for fatty liver is to lose weight.  Without doing so, it may progress to cirrhosis, permanent liver damage and possibly liver failure. Expect improvement with weight loss.       4. Gastroesophageal reflux disease, esophagitis presence not specified  Expect improvement with weight loss  Attempt to wean PPI once 10% TBW lost.    5. Essential hypertension  The current medical regimen is effective;  continue present plan and medications. Expect improvement with weight loss.      6. IFG (impaired fasting glucose)  Discussed decreasing the risks of diabetes with changes in diet, routine exercise and losing weight.  Could also consider metformin.      If you have questions, please do not hesitate to call me. I look forward to following Paddy along with you.    Sincerely,      Maria Guadalupe Tilley MD   Medical Weight Loss   [facility]     VIVIANE/dana

## 2018-09-06 NOTE — PATIENT INSTRUCTIONS
Patient warned of common side effects of phentermine including anxiety, insomnia, palpitations and increased blood pressure. It was also explained that it is for short-term usage along with diet and exercise, and that stopping the medication without making lifestyle changes will result in regain of weight. Patient states understanding.     Weight loss medications are controlled substances.  They require routine follow up. Prescription or pills that are lost or destroyed will not be replaced.       Start phentermine with 1/2 pill a day for at least 1 week to see if that will control your appetite.  Go up to a full pill when needed.       Limit alcohol to 4 drinks per week.        Exercise 30 min 3 days a week. Gradually increase.     Patient counseled in strategies for long term weight loss and maintenance: Keeping a food diary, exercise for 1 hour a day and eating breakfast everyday.       3 meals a day made up of the following:  Unlimited green vegetables, tomatoes, mushrooms, spaghetti squash, cauliflower, meat, poultry, seafood, eggs and hard cheeses.   Milk and plain yogurt  Dressings, seasonings, condiments, etc should have less than 2 g sugars.   Beans (1-1.5 cups) or nuts (1/4 cup) can have 1 x a day.   1-2 servings of citrus fruits, berries, pineapple or melon a day (1/2 cup)  Avoid fried foods    No grains, rice, pasta, potatoes, bread, corn, peas, oatmeal, grits, tortillas, crackers, chips    No soda, sweet tea, juices or lemonade    [email or web address] for recipes. Moderate carb intake      *You can substitute regular dairy and/or dressings, and whole eggs for egg whites in the ideas below.       Meal Ideas for Regular Bariatric Diet  *Recipes and products available at www.bariatriceating.com      Breakfast: (15-20g protein)    - Egg white omelet: 2 egg whites or ½ cup Egg Beaters. (Optional proteins: cheese, shrimp, black beans, chicken, sliced turkey) (Optional veggies: tomatoes, salsa, spinach,  mushrooms, onions, green peppers, or small slice avocado)     - Egg and sausage: 1 egg or ¼ cup Egg Beaters (any variety), with 1 fransico or 2 links of Turkey sausage or Veggie breakfast sausage ([employer] or Moonfruit)    - Crust-less breakfast quiche: To make a glass pie dish, mix 4oz part skim Ricotta, 1 cup skim milk, and 2 eggs as your base. Add protein: shredded cheese, sliced lean ham or turkey, turkey hill/sausage. Add veggies: tomato, onion, green onion, mushroom, green pepper, spinach, etc.    - Yogurt parfait: Mix 1 - 6oz container Dannon Light N Fit vanilla yogurt, with ¼ cup Kashi Go Lean cereal    - Cottage cheese and fruit: ½ cup part-skim cottage cheese or ricotta cheese topped with fresh fruit or sugar free preserves     - Rohini Christopher's Vanilla Egg custard* (add 2 Tbsp instant coffee granules to make Cappuccino Custard*)    - Hi-Protein café latte (skim milk, decaf coffee, 1 scoop protein powder). Optional to add Sugar free syrup or extract flavoring.    Lunch: (20-30g protein)    - ½ cup Black bean soup (Homemade or Progresso), with ¼ cup shredded low-fat cheese. Top with chopped tomato or fresh salsa.     - Lean deli turkey breast and low-fat sliced cheese, mustard or light hudson to moisten, rolled up together, or wrapped in a Micah lettuce leaf    - Chicken salad made from dinner leftovers, moisten with low-fat salad dressing or light hudson. Also try leftover salmon, shrimp, tuna or boiled eggs. Serve ½ cup over dark green salad    - Fat-free canned refried beans, topped with ¼ cup shredded low-fat cheese. Top with chopped tomato or fresh salsa.     - Greek salad: Top mixed greens with 1-2oz grilled chicken, tomatoes, red onions, 2-3 kalamata olives, and sprinkle lightly with feta cheese. Spritz with Balsamic vinegar to taste.     - Crust-less lunch quiche: To make a glass pie dish, mix 4oz part skim Ricotta, 1 cup skim milk, and 2 eggs as your base. Add protein: shredded cheese, sliced lean  ham or turkey, shrimp, chicken. Add veggies: tomato, onion, green onion, mushroom, green pepper, spinach, artichoke, broccoli, etc.    - Pizza bake: tomato sauce, low-fat shredded mozzarella and turkey pepperoni or Sammarinese hill. Add any veggies.    - Cucumber crab bites: Spread ¼ cup crab dip (lump crabmeat + light cream cheese and green onions) over sliced cucumber.     - Chicken with light spinach and artichoke dip*: Puree in : 6oz cooked and drained spinach, 2 cloves garlic, 1 can cannelloni beans, ½ cup chopped green onions, 1 can drained artichoke hearts (not marinated in oil), lemon juice and basil. Mix in 2oz chopped up chicken.    Supper: (20-30g protein)    - Serve grilled fish over dark green salad tossed with low-fat dressing, served with grilled asparagus mcleod     - Rotisserie chicken salad: served with sliced strawberries, walnuts, fat-free feta cheese crumbles and 1 tbsp Snows Own Light Raspberry [place] Vinaigrette    - Shrimp cocktail: Dip cold boiled shrimp in homemade low-sugar cocktail sauce (1/2 cup Carlos Manuel One Carb ketchup, 2 tbsp horseradish, 1/4 tsp hot sauce, 1 tsp Worcestershire sauce, 1 tbsp freshly-squeezed lemon juice). Serve with dark green salad, walnuts, and crumbled blue cheese drizzled with olive oil and Balsamic vinegar    - Tuna Melt: Spread tuna salad onto 2 thick slices of tomato. Top with low-fat cheese and broil until cheese is melted. May also be made with chicken salad of shrimp salad. [place] with different types of cheeses.    - Homemade low-fat Chili using extra lean ground beef or ground turkey. Top with shredded cheese and salsa as desired. May add dollop fat-free sour cream if desired    - Dinner Omelet with shrimp or chicken and onion, green peppers and chives.    - No noodle lasagna: Use sliced zucchini or eggplant in place of noodles.  Layer with part skim ricotta cheese and low sugar meat sauce (use very lean ground beef or ground turkey).    -  Mexican chicken bake: Bake chunks of chicken breast or thigh with taco seasoning, Pace brand enchilada sauce, green onions and low-fat cheese. Serve with ¼ cup black beans or fat free refried beans topped with chopped tomatoes or salsa.    - Praneeth frozen meatballs, simmered in Classico Marinara sauce. Different flavors of salsa or spaghetti sauce create different dishes! Sprinkle with parmesan cheese. Serve with grilled or steamed veggies, or a dark green salad.    - Simmer boneless skinless chicken thigh chunks in Classico Marinara sauce or roasted salsa until tender with chopped onion, bell pepper, garlic, mushrooms, spinach, etc.     - Hamburger, without the bun, dressed the way you like. Served with grilled or steamed veggies.    - Eggplant parmesan: Bake slices of eggplant at 350 degrees for 15 minutes. Layer tomato sauce, sliced eggplant and low-fat mozzarella cheese in a baking dish and cover with foil. Bake 30-40 more minutes or until bubbly. Uncover and bake at 400 degrees for about 15 more minutes, or until top is slightly crisp.    - Fish tacos: grilled/baked white fish, wrapped in Micah lettuce leaf, topped with salsa, shredded low-fat cheese, and light coleslaw.    Snacks: (100-200 calories; >5g protein)    - 1 low-fat cheese stick with 8 cherry tomatoes or 1 serving fresh fruit  - 4 thin slices fat-free turkey breast and 1 slice low-fat cheese  - 4 thin slices fat-free honey ham with wedge of melon  - 1/4 cup unsalted nuts with ½ cup fruit  - 6-oz container Dannon Light n Fit vanilla yogurt, topped with 1oz unsalted nuts         - apple, celery or baby carrots spread with 2 Tbsp natural peanut butter or almond butter   - apple slices with 1 oz slice low-fat cheese  - celery, cucumber, bell pepper or baby carrots dipped in ¼ cup hummus bean spread or light spinach and artichoke dip (*recipe in lunch section)  - 100 calorie bag microwave light popcorn with 3 tbsp grated parmesan cheese  - Ho  Links Beef Steak - 14g protein! (similar to beef jerky)  - 2 wedges Laughing Cow - Light Herb & Garlic Cheese with sliced cucumber or green bell pepper  - 1/2 cup low-fat cottage cheese with ¼ cup fruit or ¼ cup salsa  - RTD Protein drinks: Atkins, Low Carb Slim Fast, EAS light, Muscle Milk Light, etc.  - Homemade Protein drinks: [facility] Soy95, Isopure, Nectar, UNJURY, Whey Gourmet, etc. Mix 1 scoop powder with 8oz skim/1% milk or light soymilk.  - Protein bars: Atkins, EAS, Pure Protein, Think Thin, Detour, etc. Must have 0-4 grams sugar - Read the label.    Takeout Options: No more than twice/week  Deli - Salads (no pasta or rice), meats, cheeses. Roasted chicken. Lox (salmon)    Mexican - Platters which don't include tortillas, chips, or rice. Go easy on the beans. Example: Fajitas without the tortillas. Ask the  not to bring chips to the table if they are too tempting.    Greek - Meat or fish and vegetable, but no bread or rice. Including hummus, baba ganoush, etc, is OK. Most sit-down Greek restaurants can provide you with cucumber slices for dipping instead of ami bread.    Fast Food (Avoid as much as possible) - Salads (no croutons and limit salad dressing to 2 tbsp), grilled chicken sandwich without the bun and ask for no hudson. Cryss low fat chili or Taco Bell pintos and cheese.    BBQ - The meats are fine if you ask for sauces on the side, but most of the traditional side dishes are loaded with carbs. Rakesh slaw, baked beans and BBQ sauce are typically made with sugar.    Chinese - Nothing deep-fried, no rice or noodles. Many Chinese sauces have starch and sugar in them, so you'll have to use your judgement. If you find that these sauces trigger cravings, or cause Dumping, you can ask for the sauce to be made without sugar or just use soy sauce.

## 2018-09-06 NOTE — LETTER
September 13, 2018      MD [facility] - Bariatric Surgery  [address]  [facility] [address]  Phone: [phone]  Fax: [phone]          Patient: Paddy Wong   MR Number: [id number]   YOB: 1960   Date of Visit: 9/6/2018       Dear Dr. Alfredo Felix:    Thank you for referring Paddy Wong to me for evaluation. Attached you will find relevant portions of my assessment and plan of care.    If you have questions, please do not hesitate to call me. I look forward to following Paddy Wong along with you.    Sincerely,    Maria Guadalupe Tilley MD    Enclosure  CC:  No Recipients    If you would like to receive this communication electronically, please contact [email or web address] or [phone] to request more information on [employer] access.    For providers and/or their staff who would like to refer a patient to [facility], please contact us through our one-stop-shop provider referral line, [facility], at [phone].    If you feel you have received this communication in error or would no longer like to receive these types of communications, please e-mail [email or web address]

## 2018-12-27 ENCOUNTER — OFFICE VISIT (OUTPATIENT)
Dept: BARIATRICS | Facility: CLINIC | Age: 58
End: 2018-12-27
Payer: COMMERCIAL

## 2018-12-27 VITALS
HEART RATE: 72 BPM | SYSTOLIC BLOOD PRESSURE: 110 MMHG | BODY MASS INDEX: 39.96 KG/M2 | WEIGHT: 263.69 LBS | HEIGHT: 68 IN | DIASTOLIC BLOOD PRESSURE: 66 MMHG

## 2018-12-27 DIAGNOSIS — E66.01 CLASS 3 SEVERE OBESITY DUE TO EXCESS CALORIES WITH SERIOUS COMORBIDITY AND BODY MASS INDEX (BMI) OF 40.0 TO 44.9 IN ADULT: Primary | ICD-10-CM

## 2018-12-27 PROCEDURE — 3078F DIAST BP <80 MM HG: CPT | Mod: CPTII,S$GLB,, | Performed by: INTERNAL MEDICINE

## 2018-12-27 PROCEDURE — 3074F SYST BP LT 130 MM HG: CPT | Mod: CPTII,S$GLB,, | Performed by: INTERNAL MEDICINE

## 2018-12-27 PROCEDURE — 3008F BODY MASS INDEX DOCD: CPT | Mod: CPTII,S$GLB,, | Performed by: INTERNAL MEDICINE

## 2018-12-27 PROCEDURE — 99999 PR PBB SHADOW E&M-EST. PATIENT-LVL III: CPT | Mod: PBBFAC,,, | Performed by: INTERNAL MEDICINE

## 2018-12-27 PROCEDURE — 99214 OFFICE O/P EST MOD 30 MIN: CPT | Mod: S$GLB,,, | Performed by: INTERNAL MEDICINE

## 2018-12-27 RX ORDER — FLUTICASONE PROPIONATE 50 MCG
SPRAY, SUSPENSION (ML) NASAL
Refills: 6 | COMMUNITY
Start: 2018-11-27

## 2018-12-27 NOTE — PATIENT INSTRUCTIONS
[facility] (code [id number])-     No more than 2000 josue and 50-60 g total carbs per day.      No grains, rice, pasta, potatoes, bread, corn, peas, oatmeal, grits, tortillas, crackers, chips    No soda, sweet tea, juices or lemonade    [email or web address] for recipes. Moderate carb intake    Exercise - continue    Use refill you have as needed.     3 meals a day made up of the following:  Unlimited green vegetables, tomatoes, mushrooms, spaghetti squash, cauliflower, meat, poultry, seafood, eggs and hard cheeses.   Milk and plain yogurt  Dressings, seasonings, condiments, etc should have less than 2 g sugars.   Beans (1-1.5 cups) or nuts (1/4 cup) can have 1 x a day.   1-2 servings of citrus fruits, berries, pineapple or melon a day (1/2 cup)  Avoid fried foods    No grains, rice, pasta, potatoes, bread, corn, peas, oatmeal, grits, tortillas, crackers, chips    No soda, sweet tea, juices or lemonade    [email or web address] for recipes. Moderate carb intake      Dinner in Under 30 minutes and 400 calories.     Baked Chicken breast with Broccoli Cheese Casserole  2-3 chicken breast (approximately 6-8 oz each)    2 tsp each garlic and herb Mrs. Dash seasoning   2 tsp your favorite creole seasoning  2 tablespoons of balsamic vinegar  2 - 10 oz packages of frozen broccoli  1 egg   ½ cup skim milk  ½ tsp paprika   ½ tsp cayenne pepper   1 can fat free cream of mushroom soup.   1 .5 cups of shredded cheddar cheese    Pre-heat oven to 450 degrees. Toss 2-3 chicken breast (approximately 6-8 oz each) in 2 tsp each garlic and herb Mrs. Dash seasoning, 2 tsp your favorite creole seasoning, and 2 tablespoons of balsamic vinegar. This can also be done up to 24 hours ahead of time, and the chicken can be left in the refrigerator to marinate. Place on a baking sheet sprayed with non-stick cooking spray and bake on 450 degrees for 10 min, then reduce heat to 350 degrees and cook an addition 10-15 minutes until chicken is cooked through.    While chicken is baking, microwave safe dish, thaw 2 - 10 oz packages of frozen broccoli. Drain any excess water, season with salt, pepper, all-purpose seasoning of your choice. In another bowl, whisk together 1 egg, ½ cup skim milk, ½ tsp paprika, ½ tsp cayenne pepper and 1 can fat free cream of mushroom soup. Combine broccoli, soup mixture, 1 cup of shredded cheddar cheese in baking dish sprayed with cooking spray. Top with remaining cheese. Bake in the oven with chicken for about 20 min or until set cheese is melted and barry.     Makes 4 servings. 389 calories per serving.10 g fat, 13 g carbs, 54g protein     Sheet Pan San German Shrimp  1 pound large shrimp, shelled, peeled and deveined.   2 tablespoon olive oil  The zest and the juice of 1 lime  ½ tsp chili powder  ½-1 tsp cayenne pepper  1 tsp cumin  ½ tsp dry oregano  1 red bell pepper  1 green bell pepper  1 red onion  2 cups mushrooms, quartered  1 avocado  Whole janet lettuce leaves  Preheat oven to 375 degrees.  In a bowl combine shrimp, lime juice, lime zest, cumin, cayenne pepper, chili powder, and a pinch of salt. Set aside.   Slice peppers and onions into thin strips. Toss in 1 tablespoon of olive oil. Sprinkle with salt and pepper and arrange in a single layer over 1/3 of a large sheet pan  Toss mushrooms with remaining olive oil, oregano, salt and pepper. Arrange in single layer on sheet pan. Place sheet pan in oven for about 15-20 minutes until vegetables are softened, cooked about ¾ of the way through. Remove the sheet pan from oven.  Change setting on oven to low broil.  If needed, rearrange vegetables to make room for shrimp. Arrange the shrimp in a single layer on the sheet pan and return pan to oven. After 5 minutes, flip shrimp. Cook 3-5 additional minutes, or until  Shrimp are opaque.   Serve shrimp and cooked vegetables on lettuce leaves with sliced avocado.   Makes 4 servings. Calories per servin; 23g fat, 19 g carbohydrates, 27g  protein.    Zoodles Primavera with Seasoned Ricotta  8 cups zucchini noodles. These can be purchased already prepared, or you can make them on your own with whole zucchini and a vegetable spiralizer.   1.5 cups cherry tomatoes, quartered  2 cups sliced mushrooms  1 cup chopped fresh broccoli  1 cup frozen peas and carrots  2 cloves garlic, finely chopped  16 oz part skim ricotta cheese  2 oz Neufchatel cream cream cheese, cut into small cubes  Juice and zest of 1 lemon  1 pinch red pepper flakes    2 tsp Italian seasoning  4-5 leaves fresh basil, thinly sliced  1 tablespoon of olive oil  Parmesan cheese for topping (optional)     In a bowl, combine ricotta cheese, 1 tsp Italian seasoning, ½ teaspoon lemon zest. Season with salt and pepper to taste. Mix well. Fold in half of fresh basil. Set aside.   Heat a large skillet to medium high. Add olive oil. Sautee mushrooms until starting to become tender. Season them with salt and pepper while cooking.  Add broccoli and peas and carrots. Reduce heat to medium. Cover pan and cook for 4-5 minutes until broccoli is tender and peas and carrots are warmed through. Add Neufchatel cheese, Italian seasoning, red pepper flakes, 1 tsp lemon zest and lemon juice. Stir until a smooth sauce is formed. Add zucchini noodles (zoodles) to skillet and toss in sauce, then add cherry tomatoes.  Separate zoodle and vegetables into 4 equal portions. Serve each with a ¼ cup serving of seasoned ricotta cheese. Sprinkle with grated parmesan cheese or fresh basil,  if desired.   Makes 4 servings. Calories per servin calories, 32 g carbs, 33 g protein, 18 g fat

## 2018-12-27 NOTE — PROGRESS NOTES
"Subjective:       Patient ID: Paddy Wong is a 58 y.o. male.    Chief Complaint: Follow-up    Pt here today for follow-up. Has lost 41 lbs. Has been following eating plan, except for holidays. Exercising 5 days a week walking and swimming and lifting weights. Quit the medication after 2 months. Feels his appetite is down enough. He did have labs with his primary ([facility]) and they were very good. Trying to stay under 38 net carbs a day.   B: "Keto" shake with MCT oil and 2 tablespoon yogurt with almonds, dark ying and blueberries  Sn: beef jerky, pudding cake  L: meatballs, italian sausage and veg  D: Cauli pizza with veg.            Review of Systems   Constitutional: Positive for fatigue. Negative for chills and fever.   Eyes: Positive for pain.   Respiratory: Negative for shortness of breath.         + snores on back   Cardiovascular: Negative for chest pain and leg swelling.   Gastrointestinal: Negative for constipation and diarrhea.        + GERD   Genitourinary: Negative for difficulty urinating and dysuria.   Musculoskeletal: Positive for arthralgias. Negative for back pain.   Neurological: Negative for dizziness and light-headedness.   Psychiatric/Behavioral: Negative for dysphoric mood. The patient is not nervous/anxious.        Objective:     /66   Pulse 72   Ht 5' 8" (1.727 m)   Wt 119.6 kg (263 lb 10.7 oz)   BMI 40.09 kg/m²     Physical Exam   Constitutional: He is oriented to person, place, and time. He appears well-developed. No distress.   Morbidly obese     HENT:   Head: Normocephalic and atraumatic.   Eyes: Pupils are equal, round, and reactive to light. No scleral icterus.   Neck: Normal range of motion. Neck supple.   Cardiovascular: Normal rate.   Pulmonary/Chest: Effort normal.   Musculoskeletal: Normal range of motion. He exhibits no edema.   Neurological: He is alert and oriented to person, place, and time.   Skin: Skin is warm and dry.   Psychiatric: He has a normal mood and affect. " His behavior is normal. Judgment normal.   Vitals reviewed.      Assessment:       1. Class 3 severe obesity due to excess calories with serious comorbidity and body mass index (BMI) of 40.0 to 44.9 in adult        Plan:         Paddy was seen today for follow-up.    Diagnoses and all orders for this visit:    Class 3 severe obesity due to excess calories with serious comorbidity and body mass index (BMI) of 40.0 to 44.9 in adult         [employer] [facility] (code [id number])-     No more than 2000 josue and 50-60 g total carbs per day.      No grains, rice, pasta, potatoes, bread, corn, peas, oatmeal, grits, tortillas, crackers, chips    No soda, sweet tea, juices or lemonade    [email or web address] for recipes. Moderate carb intake    Exercise - continue    Use refill you have as needed.       3 meals a day made up of the following:  Unlimited green vegetables, tomatoes, mushrooms, spaghetti squash, cauliflower, meat, poultry, seafood, eggs and hard cheeses.   Milk and plain yogurt  Dressings, seasonings, condiments, etc should have less than 2 g sugars.   Beans (1-1.5 cups) or nuts (1/4 cup) can have 1 x a day.   1-2 servings of citrus fruits, berries, pineapple or melon a day (1/2 cup)  Avoid fried foods    No grains, rice, pasta, potatoes, bread, corn, peas, oatmeal, grits, tortillas, crackers, chips    No soda, sweet tea, juices or lemonade    [email or web address] for recipes. Moderate carb intake

## 2019-04-16 ENCOUNTER — OFFICE VISIT (OUTPATIENT)
Dept: BARIATRICS | Facility: CLINIC | Age: 59
End: 2019-04-16
Payer: COMMERCIAL

## 2019-04-16 VITALS
WEIGHT: 259.06 LBS | DIASTOLIC BLOOD PRESSURE: 62 MMHG | HEART RATE: 59 BPM | BODY MASS INDEX: 39.26 KG/M2 | HEIGHT: 68 IN | SYSTOLIC BLOOD PRESSURE: 130 MMHG

## 2019-04-16 DIAGNOSIS — E66.01 CLASS 2 SEVERE OBESITY WITH BODY MASS INDEX (BMI) OF 35 TO 39.9 WITH SERIOUS COMORBIDITY: Primary | ICD-10-CM

## 2019-04-16 PROCEDURE — 3075F PR MOST RECENT SYSTOLIC BLOOD PRESS GE 130-139MM HG: ICD-10-PCS | Mod: CPTII,S$GLB,, | Performed by: INTERNAL MEDICINE

## 2019-04-16 PROCEDURE — 3078F DIAST BP <80 MM HG: CPT | Mod: CPTII,S$GLB,, | Performed by: INTERNAL MEDICINE

## 2019-04-16 PROCEDURE — 99999 PR PBB SHADOW E&M-EST. PATIENT-LVL III: CPT | Mod: PBBFAC,,, | Performed by: INTERNAL MEDICINE

## 2019-04-16 PROCEDURE — 3078F PR MOST RECENT DIASTOLIC BLOOD PRESSURE < 80 MM HG: ICD-10-PCS | Mod: CPTII,S$GLB,, | Performed by: INTERNAL MEDICINE

## 2019-04-16 PROCEDURE — 99999 PR PBB SHADOW E&M-EST. PATIENT-LVL III: ICD-10-PCS | Mod: PBBFAC,,, | Performed by: INTERNAL MEDICINE

## 2019-04-16 PROCEDURE — 3008F BODY MASS INDEX DOCD: CPT | Mod: CPTII,S$GLB,, | Performed by: INTERNAL MEDICINE

## 2019-04-16 PROCEDURE — 99213 OFFICE O/P EST LOW 20 MIN: CPT | Mod: S$GLB,,, | Performed by: INTERNAL MEDICINE

## 2019-04-16 PROCEDURE — 3008F PR BODY MASS INDEX (BMI) DOCUMENTED: ICD-10-PCS | Mod: CPTII,S$GLB,, | Performed by: INTERNAL MEDICINE

## 2019-04-16 PROCEDURE — 3075F SYST BP GE 130 - 139MM HG: CPT | Mod: CPTII,S$GLB,, | Performed by: INTERNAL MEDICINE

## 2019-04-16 PROCEDURE — 99213 PR OFFICE/OUTPT VISIT, EST, LEVL III, 20-29 MIN: ICD-10-PCS | Mod: S$GLB,,, | Performed by: INTERNAL MEDICINE

## 2019-04-16 NOTE — PATIENT INSTRUCTIONS
No more than 2000 josue and 50-60 g total carbs per day.      No grains, rice, pasta, potatoes, bread, corn, peas, oatmeal, grits, tortillas, crackers, chips    No soda, sweet tea, juices or lemonade    [email or web address] for recipes. Moderate carb intake    Exercise - Resume gradually.     3 meals a day made up of the following:  Unlimited green vegetables, tomatoes, mushrooms, spaghetti squash, cauliflower, meat, poultry, seafood, eggs and hard cheeses.   Milk and plain yogurt  Dressings, seasonings, condiments, etc should have less than 2 g sugars.   Beans (1-1.5 cups) or nuts (1/4 cup) can have 1 x a day.   1-2 servings of citrus fruits, berries, pineapple or melon a day (1/2 cup)  Avoid fried foods    No grains, rice, pasta, potatoes, bread, corn, peas, oatmeal, grits, tortillas, crackers, chips    No soda, sweet tea, juices or lemonade    [email or web address] for recipes. Moderate carb intake            Look up keto bread or fat head dough.     PILLOWY LIGHT CLOUD BREAD    Author: Sendy from [employer].  Prep time:  15 mins Cook time:  30 mins Total time:  45 mins  Yield: 9     INGREDIENTS  3 large eggs,   3 tablespoons fat free cream cheese, room temperature  ¼ teaspoon cream of tartar  1 teaspoon artificial sweetener    INSTRUCTIONS  Preheat oven to 300 degrees Fahrenheit. Line a baking sheet with parchment paper.  Mix together egg yolks, cream cheese and sweetener in a small bowl. Set aside.  Using an electric mixer, whisk egg whites and cream of tartar on high speed until stiff peaks are formed, about 5-6 minutes.  Gently fold in cream cheese mixture. Try not to deflate the egg whites.  Scoop batter onto prepared baking sheet, into even rounds, about the size of a hamburger bun.  Bake for 30 minutes, or until barry brown.  Transfer bread to wire rack and let cool.    NOTES  Store in an airtight container.       Eating well to be healthy and lose weight does not have to be hard. It  also does not have to be time consuming or expensive. There a lots of ways you can work in healthy choices into your day. Many of these are easy, quick and even family friendly!    Homemade hazelnut au lait  Brew your favorite brand of hazelnut flavored coffee ([employer] makes a good one). Microwave 1/2 cup of milk that fits your eating plan (whole, skim or sugar-free almond milk can all work). Add half to 1 oz sugar free hazelnut syrup.     Quick and easy breakfast  1-2 boiled eggs or mini-frittatas with a tangerine. The boiled eggs and mini-frittatas can both be made ahead and last for up to 4 days in the refrigerator. Bonus if you portion them out in ready to go containers or zipper bags.     Breakfast Egg Muffins with Hill and Spinach  Makes 12 muffins  Ingredients    6 eggs  ¼ cup milk  ¼ teaspoon salt  2 cups grated cheddar cheese  3/4 cup spinach, cooked and drained (about 8 oz fresh spinach)  6 hill slices, cooked, drained of fat, and chopped  1/2 cup grated Parmesan cheese (optional)    Instructions      Preheat oven to 350 degrees. Use a regular 12-cup muffin pan. Spray the muffin pan with non-stick cooking spray.  In a large bowl, beat eggs until smooth. Add milk, salt, Cheddar cheese and mix. Stir spinach, cooked hill into the egg mixture. Ladle the egg mixture into greased muffin cups ¾ full.  Top each muffin cup with grated Parmesan cheese.  Bake for 25 minutes. Remove from the oven, let the muffins cool for 30 minutes before removing them from the pan.      Be a brown bagger! When you make dinner, plan for an extra helping. When you serve your plate for dinner, serve an additional helping into a container that you can take with you the next day. If you don't have a refrigerator available during the day, an insulated lunch bag and ice packs will help you safely store you lunch.     Cold Brewed Iced tea. Fill a pitcher with 64 oz filtered water. Add either 4 regular tea bags of your choice or a large  iced tea bag. Refrigerate over night then remove the tea bags. The tea will not be bitter and is super flavorful. Get creative! Try combinations like green tea and hibiscus tea or black tea with lemon zinger. Add orange or lemon slices for even more flavor.     Snack wisely. Protein filled snacks will fill you up, allowing you to get by with fewer calories. String cheese, pork skins (chicharrones), turkey pepperoni, or celery with cream cheese will all fit the bill.       Ditch the take out. Turkey tacos (with or without a low carb tortilla), burgers (without the bun), or fun stir fries are all quick and easy. The whole family will be happy, and you can save calories and money.      Orange Chicken Stir álvarez with asparagus   Makes 6 servings  Ingredients:    1.5 lbs boneless skinless chicken breast/tenders, diced into 1-inch pieces  1 Tbsp extra virgin olive or avocado oil, divided  2 lb asparagus, end portions trimmed and remainder diced into 1 1/2-inch pieces  1 small yellow onion, sliced into thin strips  8 oz button mushrooms, sliced  1 Tbsp peeled and finely grated fresh madeline  4 cloves garlic, minced  1/2 cup low-sodium chicken broth  Juice of 2 fresh oranges  2 Tbsp low sodium soy sauce  2 Tbsp cornstarch  Sea salt and freshly ground black pepper    Directions:    In a 12-inch non-stick wok, heat 1/2 oil over moderately high heat. Once oil is hot, add diced chicken and season lightly with salt and pepper. Sauté until cooked through, tossing occasionally, about 5-6 minutes.  Place chicken on a large plate and set aside. Return wok, reduce to medium-high heat, add remaining oil.  Once oil is hot, add asparagus, yellow onion and mushrooms, and sauté until tender-crisp, about 4 - 5 minutes, adding in garlic and madeline during the last 1 minute of sautéing.  Meanwhile, in a mixing bowl whisk together chicken broth, orange juice, soy sauce and cornstarch until well blended.  Pour chicken broth mixture into skill  with veggies, season with salt and pepper to taste, and bring mixture to a light boil, stirring constantly. Allow mixture to gently boil, stirring constantly, until thickened, about 1 minute.  Toss chicken into mixture and serve immediately over cauliflower rice or Shirataki noodles.      Skinny Chicken Tortilla Soup  Makes 7 servings    2 teaspoons olive oil  1 cup onion, chopped (about 1 small)  2 cups celery, sliced (about 4 medium stalks)  4 garlic cloves, minced  4 medium tomatoes, chopped  2 cups water  4 cups low-sodium organic chicken broth  3 cups chopped and/or shredded rotisserie chicken, skinless  2 cups sliced carrots (about 3 medium)  1 teaspoon dried oregano leaves  2 teaspoons chili powder  1 teaspoon garlic powder  2 teaspoons cumin  ½ teaspoon cayenne pepper (add less or omit, if you don't want a spicy soup)  ½ teaspoon sea salt + more to taste  ½ teaspoon pepper + more to taste    Directions:   Put all ingredients into a large crock pot. Cook on low for 5-6 hours.     Optional garnish with chopped avocado, chopped fresh cilantro, crumbled Cotija cheese, sour cream, Greek yogurt, your favorite hot sauce.         Vegan Avocado Banana Chocolate Pudding  Makes 4 servings  Ingredients    1 1/2 ripe avocados  2 ripe bananas  6 tbsp raw cacao powder or unsweetened cocoa powder  2-3 tbsp maple syrup (or calorie free sweetener)  1/4 cup almond milk  Instructions    Blend everything together in a  until the consistency is smooth and velvety. Taste and see if more sweetener is needed and stir to make sure everything is evenly mixed. Blend a second time if needed.  Top with banana slices, raw cacao nibs, almond butter, or any other toppings and enjoy!

## 2019-04-16 NOTE — PROGRESS NOTES
"Subjective:       Patient ID: Paddy Wong is a 58 y.o. male.    Chief Complaint: Follow-up    Pt here today for follow-up. Has lost 4 more lbs, net neg 45 lbs.  Has been following eating plan, except for holidays. He has had a herniated disc in hs neck and flu, and he has not been able to exercise as much. He has gotten epidural inj and feels like he can get back to exercise now.  Quit the medication after initial 2 months. Feels his appetite is down enough. Doing well with diet. Has gained some weight back, about 14#.         Review of Systems   Constitutional: Positive for fatigue. Negative for chills and fever.   Eyes: Positive for pain.   Respiratory: Negative for shortness of breath.         + snores on back   Cardiovascular: Negative for chest pain and leg swelling.   Gastrointestinal: Negative for constipation and diarrhea.        + GERD   Genitourinary: Negative for difficulty urinating and dysuria.   Musculoskeletal: Positive for arthralgias. Negative for back pain.   Neurological: Negative for dizziness and light-headedness.   Psychiatric/Behavioral: Negative for dysphoric mood. The patient is not nervous/anxious.        Objective:     /62   Pulse (!) 59   Ht 5' 8" (1.727 m)   Wt 117.5 kg (259 lb 0.7 oz)   BMI 39.39 kg/m²     Physical Exam   Constitutional: He is oriented to person, place, and time. He appears well-developed. No distress.   Morbidly obese     HENT:   Head: Normocephalic and atraumatic.   Eyes: Pupils are equal, round, and reactive to light. No scleral icterus.   Neck: Normal range of motion. Neck supple.   Cardiovascular: Normal rate.   Pulmonary/Chest: Effort normal.   Musculoskeletal: Normal range of motion. He exhibits no edema.   Neurological: He is alert and oriented to person, place, and time.   Skin: Skin is warm and dry.   Psychiatric: He has a normal mood and affect. His behavior is normal. Judgment normal.   Vitals reviewed.      Assessment:       1. Class 2 severe " obesity with body mass index (BMI) of 35 to 39.9 with serious comorbidity        Plan:         Paddy was seen today for follow-up.    Diagnoses and all orders for this visit:    Class 2 severe obesity with body mass index (BMI) of 35 to 39.9 with serious comorbidity    He will continue with ketogenic diet, and work on increasing his exercise again. We will have another visit in 3 months to check his progress.    [facility] (code [id number])-     No more than 2000 josue and 50-60 g total carbs per day.      No grains, rice, pasta, potatoes, bread, corn, peas, oatmeal, grits, tortillas, crackers, chips    No soda, sweet tea, juices or lemonade    [email or web address] for recipes. Moderate carb intake    Exercise - continue    Use refill you have as needed.       3 meals a day made up of the following:  Unlimited green vegetables, tomatoes, mushrooms, spaghetti squash, cauliflower, meat, poultry, seafood, eggs and hard cheeses.   Milk and plain yogurt  Dressings, seasonings, condiments, etc should have less than 2 g sugars.   Beans (1-1.5 cups) or nuts (1/4 cup) can have 1 x a day.   1-2 servings of citrus fruits, berries, pineapple or melon a day (1/2 cup)  Avoid fried foods    No grains, rice, pasta, potatoes, bread, corn, peas, oatmeal, grits, tortillas, crackers, chips    No soda, sweet tea, juices or lemonade    [email or web address] for recipes. Moderate carb intake

## 2019-11-01 ENCOUNTER — HOSPITAL ENCOUNTER (EMERGENCY)
Facility: HOSPITAL | Age: 59
Discharge: PSYCHIATRIC OR SUBSTANCE USE FACILITY | End: 2019-11-01
Attending: EMERGENCY MEDICINE
Payer: COMMERCIAL

## 2019-11-01 VITALS
BODY MASS INDEX: 40.02 KG/M2 | HEART RATE: 73 BPM | HEIGHT: 67 IN | TEMPERATURE: 99 F | RESPIRATION RATE: 18 BRPM | SYSTOLIC BLOOD PRESSURE: 128 MMHG | OXYGEN SATURATION: 97 % | DIASTOLIC BLOOD PRESSURE: 78 MMHG | WEIGHT: 255 LBS

## 2019-11-01 DIAGNOSIS — F41.9 ANXIETY: ICD-10-CM

## 2019-11-01 DIAGNOSIS — R45.851 SUICIDAL IDEATION: Primary | ICD-10-CM

## 2019-11-01 DIAGNOSIS — F32.A DEPRESSION WITH SUICIDAL IDEATION: ICD-10-CM

## 2019-11-01 DIAGNOSIS — R45.851 DEPRESSION WITH SUICIDAL IDEATION: ICD-10-CM

## 2019-11-01 DIAGNOSIS — G47.00 INSOMNIA, UNSPECIFIED TYPE: ICD-10-CM

## 2019-11-01 LAB
ALBUMIN SERPL BCP-MCNC: 4.3 G/DL (ref 3.5–5.2)
ALP SERPL-CCNC: 73 U/L (ref 55–135)
ALT SERPL W/O P-5'-P-CCNC: 20 U/L (ref 10–44)
AMPHET+METHAMPHET UR QL: NEGATIVE
ANION GAP SERPL CALC-SCNC: 12 MMOL/L (ref 8–16)
APAP SERPL-MCNC: <3 UG/ML (ref 10–20)
AST SERPL-CCNC: 16 U/L (ref 10–40)
BARBITURATES UR QL SCN>200 NG/ML: NEGATIVE
BASOPHILS # BLD AUTO: 0.03 K/UL (ref 0–0.2)
BASOPHILS NFR BLD: 0.3 % (ref 0–1.9)
BENZODIAZ UR QL SCN>200 NG/ML: NEGATIVE
BILIRUB SERPL-MCNC: 1.7 MG/DL (ref 0.1–1)
BILIRUB UR QL STRIP: NEGATIVE
BUN SERPL-MCNC: 10 MG/DL (ref 6–20)
BZE UR QL SCN: NEGATIVE
CALCIUM SERPL-MCNC: 10 MG/DL (ref 8.7–10.5)
CANNABINOIDS UR QL SCN: NEGATIVE
CHLORIDE SERPL-SCNC: 106 MMOL/L (ref 95–110)
CLARITY UR: CLEAR
CO2 SERPL-SCNC: 23 MMOL/L (ref 23–29)
COLOR UR: YELLOW
CREAT SERPL-MCNC: 1 MG/DL (ref 0.5–1.4)
CREAT UR-MCNC: 117.4 MG/DL (ref 23–375)
DIFFERENTIAL METHOD: NORMAL
EOSINOPHIL # BLD AUTO: 0.1 K/UL (ref 0–0.5)
EOSINOPHIL NFR BLD: 1 % (ref 0–8)
ERYTHROCYTE [DISTWIDTH] IN BLOOD BY AUTOMATED COUNT: 13.2 % (ref 11.5–14.5)
EST. GFR  (AFRICAN AMERICAN): >60 ML/MIN/1.73 M^2
EST. GFR  (NON AFRICAN AMERICAN): >60 ML/MIN/1.73 M^2
ETHANOL SERPL-MCNC: <10 MG/DL
GLUCOSE SERPL-MCNC: 94 MG/DL (ref 70–110)
GLUCOSE UR QL STRIP: NEGATIVE
HCT VFR BLD AUTO: 50.3 % (ref 40–54)
HGB BLD-MCNC: 17.4 G/DL (ref 14–18)
HGB UR QL STRIP: NEGATIVE
KETONES UR QL STRIP: NEGATIVE
LEUKOCYTE ESTERASE UR QL STRIP: NEGATIVE
LYMPHOCYTES # BLD AUTO: 3.5 K/UL (ref 1–4.8)
LYMPHOCYTES NFR BLD: 32.1 % (ref 18–48)
MCH RBC QN AUTO: 29.9 PG (ref 27–31)
MCHC RBC AUTO-ENTMCNC: 34.6 G/DL (ref 32–36)
MCV RBC AUTO: 87 FL (ref 82–98)
METHADONE UR QL SCN>300 NG/ML: NEGATIVE
MONOCYTES # BLD AUTO: 0.9 K/UL (ref 0.3–1)
MONOCYTES NFR BLD: 7.8 % (ref 4–15)
NEUTROPHILS # BLD AUTO: 6.4 K/UL (ref 1.8–7.7)
NEUTROPHILS NFR BLD: 58.8 % (ref 38–73)
NITRITE UR QL STRIP: NEGATIVE
OPIATES UR QL SCN: NEGATIVE
PCP UR QL SCN>25 NG/ML: NEGATIVE
PH UR STRIP: 7 [PH] (ref 5–8)
PLATELET # BLD AUTO: 233 K/UL (ref 150–350)
PMV BLD AUTO: 12.2 FL (ref 9.2–12.9)
POTASSIUM SERPL-SCNC: 4 MMOL/L (ref 3.5–5.1)
PROT SERPL-MCNC: 7.8 G/DL (ref 6–8.4)
PROT UR QL STRIP: NEGATIVE
RBC # BLD AUTO: 5.81 M/UL (ref 4.6–6.2)
SODIUM SERPL-SCNC: 141 MMOL/L (ref 136–145)
SP GR UR STRIP: 1.01 (ref 1–1.03)
TOXICOLOGY INFORMATION: NORMAL
TSH SERPL DL<=0.005 MIU/L-ACNC: 1.03 UIU/ML (ref 0.4–4)
URN SPEC COLLECT METH UR: NORMAL
UROBILINOGEN UR STRIP-ACNC: NEGATIVE EU/DL
WBC # BLD AUTO: 10.96 K/UL (ref 3.9–12.7)

## 2019-11-01 PROCEDURE — 99285 EMERGENCY DEPT VISIT HI MDM: CPT | Mod: 25

## 2019-11-01 PROCEDURE — 63600175 PHARM REV CODE 636 W HCPCS: Performed by: EMERGENCY MEDICINE

## 2019-11-01 PROCEDURE — 80307 DRUG TEST PRSMV CHEM ANLYZR: CPT

## 2019-11-01 PROCEDURE — 80329 ANALGESICS NON-OPIOID 1 OR 2: CPT

## 2019-11-01 PROCEDURE — 81003 URINALYSIS AUTO W/O SCOPE: CPT | Mod: 59

## 2019-11-01 PROCEDURE — 96372 THER/PROPH/DIAG INJ SC/IM: CPT

## 2019-11-01 PROCEDURE — 85025 COMPLETE CBC W/AUTO DIFF WBC: CPT

## 2019-11-01 PROCEDURE — 80320 DRUG SCREEN QUANTALCOHOLS: CPT

## 2019-11-01 PROCEDURE — 84443 ASSAY THYROID STIM HORMONE: CPT

## 2019-11-01 PROCEDURE — 80053 COMPREHEN METABOLIC PANEL: CPT

## 2019-11-01 RX ADMIN — LORAZEPAM 2 MG: 2 INJECTION INTRAMUSCULAR; INTRAVENOUS at 02:11

## 2019-11-01 NOTE — ED PROVIDER NOTES
"Encounter Date: 11/1/2019    SCRIBE #1 NOTE: I, Michelle Ahumada, am scribing for, and in the presence of,  Dr. Soria. I have scribed the entire note.       History     Chief Complaint   Patient presents with    Suicidal     pt presents to ED today reports suicide ideations x 3 days. pt denies mental health history. he reports he is on keto diet x 1 year.      Time seen by provider: 2:29 PM    This is a 59 y.o. male with a history of HTN and HLD who presents to the ED due to suicidal ideations for the last 3 days. Wife reports patient has started a new position at work about 2 weeks ago. Patient has been feeling anxious about the new job position as he fears he will fail. He says he had an increase in anxiety for about 2 weeks and has had suicidal ideation for 3 days. When asked if he had a plan to hurt himself patient replied, "A bunch of things have gone through my mind." He notes he has not been sleeping for the last few days even when he attempts to. Patient has an appointment scheduled with a psychologist Thursday but today he said "I felt too afraid for myself." He denies auditory or visual hallucinations. Patient denies having thoughts like these before. Wife reports he has been on the Keto diet for about a year and continues it. No medications reported at this time.    The history is provided by the patient and the spouse.     Review of patient's allergies indicates:   Allergen Reactions    Morphine Nausea And Vomiting     Past Medical History:   Diagnosis Date    Arthritis     Depression     HTN (hypertension) 1/24/2017    Hyperlipidemia     Liver disease     Obesity     Pituitary adenoma     Psychiatric exam requested by authority     Psychiatric problem     Sleep difficulties      Past Surgical History:   Procedure Laterality Date    BRAIN SURGERY  2/11/16    PITUITARY TUMMOR RESECTION/WARE    FINGER AMPUTATION      KNEE ARTHROPLASTY      SHOULDER ARTHROSCOPY      TONSILLECTOMY   "     Family History   Problem Relation Age of Onset    Arthritis Mother     Diabetes Mother     Hypertension Mother     Arthritis Father     COPD Father     Diabetes Father     Hearing loss Father     Heart disease Father     Diabetes Sister     Hypertension Sister     Early death Brother     No Known Problems Maternal Aunt     No Known Problems Maternal Uncle     No Known Problems Paternal Aunt     No Known Problems Paternal Uncle     No Known Problems Maternal Grandmother     No Known Problems Maternal Grandfather     No Known Problems Paternal Grandmother     No Known Problems Paternal Grandfather     Amblyopia Neg Hx     Blindness Neg Hx     Cancer Neg Hx     Cataracts Neg Hx     Glaucoma Neg Hx     Macular degeneration Neg Hx     Retinal detachment Neg Hx     Strabismus Neg Hx     Stroke Neg Hx     Thyroid disease Neg Hx      Social History     Tobacco Use    Smoking status: Former Smoker     Packs/day: 0.00     Years: 0.00     Pack years: 0.00    Smokeless tobacco: Never Used   Substance Use Topics    Alcohol use: Yes     Alcohol/week: 3.0 standard drinks     Types: 1 Glasses of wine, 1 Cans of beer, 1 Shots of liquor per week    Drug use: No     Review of Systems   Constitutional: Negative for fever.   HENT: Negative for sore throat.    Respiratory: Negative for shortness of breath.    Cardiovascular: Negative for chest pain.   Gastrointestinal: Negative for nausea.   Genitourinary: Negative for dysuria.   Musculoskeletal: Negative for back pain.   Skin: Negative for rash.   Neurological: Negative for weakness.   Hematological: Does not bruise/bleed easily.   Psychiatric/Behavioral: Positive for suicidal ideas.   All other systems reviewed and are negative.      Physical Exam     Initial Vitals [11/01/19 1355]   BP Pulse Resp Temp SpO2   (!) 155/89 82 20 97.3 °F (36.3 °C) 100 %      MAP       --         Physical Exam    Nursing note and vitals reviewed.  Constitutional: He  appears well-developed and well-nourished. No distress.   HENT:   Head: Normocephalic and atraumatic.   Mouth/Throat: Oropharynx is clear and moist.   Eyes: Conjunctivae and EOM are normal. Pupils are equal, round, and reactive to light.   Neck: Normal range of motion. Neck supple. No stridor present. No tracheal deviation present.   Cardiovascular: Normal rate, regular rhythm and normal heart sounds.   No murmur heard.  Pulmonary/Chest: Breath sounds normal. No respiratory distress.   Abdominal: Soft. Bowel sounds are normal. There is no tenderness. There is no rebound and no guarding.   Musculoskeletal: Normal range of motion. He exhibits no edema or tenderness.   Neurological: He is alert and oriented to person, place, and time. He has normal strength. No sensory deficit.   Skin: Skin is warm and dry. Capillary refill takes less than 2 seconds.   Psychiatric: He has a normal mood and affect. His behavior is normal.         ED Course   Procedures  Labs Reviewed   COMPREHENSIVE METABOLIC PANEL - Abnormal; Notable for the following components:       Result Value    Total Bilirubin 1.7 (*)     All other components within normal limits   ACETAMINOPHEN LEVEL - Abnormal; Notable for the following components:    Acetaminophen (Tylenol), Serum <3.0 (*)     All other components within normal limits   TSH   CBC W/ AUTO DIFFERENTIAL   URINALYSIS, REFLEX TO URINE CULTURE    Narrative:     Preferred Collection Type->Urine, Clean Catch   DRUG SCREEN PANEL, URINE EMERGENCY    Narrative:     Preferred Collection Type->Urine, Clean Catch   ALCOHOL,MEDICAL (ETHANOL)          X-Rays:   Independently Interpreted Readings:   Other Readings:  Reviewed by myself, read by radiology.    Imaging Results          CT Head Without Contrast (Final result)  Result time 11/01/19 16:22:15    Final result by Yulissa Rios MD (11/01/19 16:22:15)                 Impression:      No acute intracranial process seen.    Chronic inflammatory  change of the left side of the sphenoid sinus.      Electronically signed by: Yulissa Rios MD  Date:    11/01/2019  Time:    16:22             Narrative:    EXAMINATION:  CT HEAD WITHOUT CONTRAST    CLINICAL HISTORY:  Confusion/delirium, altered LOC, unexplained;    TECHNIQUE:  Low dose axial images were obtained through the head.  Coronal and sagittal reformations were also performed. Contrast was not administered.    COMPARISON:  CT 02/29/2016    FINDINGS:  The brain is normally formed.  The ventricular system is normal in size.  No areas of hypoattenuation seen to strongly suggest an acute infarction.  No evidence of sizable remote infarction.  No intracranial mass or hemorrhage seen.  No subdural fluid collection.  There is opacification of the left side of the sphenoid sinus.  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  Possibly postop changes of a prior pituitary adenoma resection.                              Medical Decision Making:   Clinical Tests:   Lab Tests: Ordered and Reviewed  Radiological Study: Ordered and Reviewed                   ED Course as of Nov 05 0937 Fri Nov 01, 2019   1629 Assumed care of patient at shift change.  Patient presents to ER due to worsening anxiety, insomnia, depression with passive suicidal ideations.  No definitive plan, but tearful on presentation.  Screening labs obtained, unremarkable. CT head without acute process.  Patient is currently medically cleared for psychiatric evaluation and transfer to inpatient psychiatric facility.    [SS]      ED Course User Index  [SS] Devon Giraldo MD     Clinical Impression:     1. Suicidal ideation    2. Depression with suicidal ideation    3. Anxiety    4. Insomnia, unspecified type              I, Rohini Soria, personally performed the services described in this documentation. All medical record entries made by the scribe were at my direction and in my presence.  I have reviewed the chart and agree that the  record reflects my personal performance and is accurate and complete. Rohini Soria M.D. 9:37 AM11/05/2019                 Rohini Soria MD  11/05/19 0937

## 2019-11-01 NOTE — ED NOTES
Family at bedside. Security returned patient personal belongings and gave to patient's girlfriend and sister at request of patient.

## 2019-11-01 NOTE — ED NOTES
"Pt presents to the ED w/ c/ of suicidal ideation. Pt reports for the past week he has been having increasing SI without a specific plan. Pt states "what wouldn't I do" when asked if he had a specific plan. Pt denies psych hx. Reports has been on keto diet for past year. Pt reports anxiety as well. Risk sitter at bedside and patient is in paper scrubs.   "

## 2019-11-02 NOTE — ED NOTES
SPD unit 40 at facility for transport. Security notified and in unit to escort out. SPD made aware of elopement risk, verbalized understanding. Family to take all belongings, SPD aware.

## 2019-11-02 NOTE — ED NOTES
Report received. Care assumed. Pt AAOx4. Respirations even and unlabored. Pt VSS. Family at BS, family and pt aware of waiting for transport.  Denies needs/ wants at this time. Pt ambulatory to bathroom with sitter. Will continue to monitor.

## 2020-07-22 ENCOUNTER — TELEPHONE (OUTPATIENT)
Dept: ENDOCRINOLOGY | Facility: CLINIC | Age: 60
End: 2020-07-22

## 2020-07-22 ENCOUNTER — TELEPHONE (OUTPATIENT)
Dept: NEUROSURGERY | Facility: CLINIC | Age: 60
End: 2020-07-22

## 2020-07-22 DIAGNOSIS — D35.2 PITUITARY MACROADENOMA: Primary | ICD-10-CM

## 2020-07-22 DIAGNOSIS — E89.3 S/P TRANSSPHENOIDAL HYPOPHYSECTOMY: ICD-10-CM

## 2020-07-22 NOTE — TELEPHONE ENCOUNTER
----- Message from Nicolle Joyce sent at 7/22/2020 11:23 AM CDT -----  Contact: self @ [phone]  Pt is scheduled to f/u on 8-4-20.  Pls call with any imaging that may be necessary.  Pt says he will need a wide or open MRI so that he will fit.  Pls call.

## 2020-08-04 ENCOUNTER — TELEPHONE (OUTPATIENT)
Dept: NEUROSURGERY | Facility: CLINIC | Age: 60
End: 2020-08-04

## 2020-08-04 ENCOUNTER — HOSPITAL ENCOUNTER (OUTPATIENT)
Dept: RADIOLOGY | Facility: HOSPITAL | Age: 60
Discharge: HOME | End: 2020-08-04
Attending: NURSE PRACTITIONER
Payer: COMMERCIAL

## 2020-08-04 ENCOUNTER — OFFICE VISIT (OUTPATIENT)
Dept: NEUROSURGERY | Facility: CLINIC | Age: 60
End: 2020-08-04
Payer: COMMERCIAL

## 2020-08-04 VITALS
SYSTOLIC BLOOD PRESSURE: 144 MMHG | HEART RATE: 84 BPM | DIASTOLIC BLOOD PRESSURE: 80 MMHG | TEMPERATURE: 98 F | WEIGHT: 285.5 LBS | BODY MASS INDEX: 44.71 KG/M2

## 2020-08-04 DIAGNOSIS — D35.2 PITUITARY MACROADENOMA: Primary | ICD-10-CM

## 2020-08-04 DIAGNOSIS — D35.2 PITUITARY MACROADENOMA: ICD-10-CM

## 2020-08-04 DIAGNOSIS — E89.3 S/P TRANSSPHENOIDAL HYPOPHYSECTOMY: ICD-10-CM

## 2020-08-04 PROCEDURE — 70553 MRI BRAIN STEM W/O & W/DYE: CPT | Mod: 26,,, | Performed by: RADIOLOGY

## 2020-08-04 PROCEDURE — 99214 OFFICE O/P EST MOD 30 MIN: CPT | Mod: S$GLB,,, | Performed by: NEUROLOGICAL SURGERY

## 2020-08-04 PROCEDURE — 3079F DIAST BP 80-89 MM HG: CPT | Mod: CPTII,S$GLB,, | Performed by: NEUROLOGICAL SURGERY

## 2020-08-04 PROCEDURE — 70553 MRI BRAIN W WO CONTRAST: ICD-10-PCS | Mod: 26,,, | Performed by: RADIOLOGY

## 2020-08-04 PROCEDURE — 99214 PR OFFICE/OUTPT VISIT, EST, LEVL IV, 30-39 MIN: ICD-10-PCS | Mod: S$GLB,,, | Performed by: NEUROLOGICAL SURGERY

## 2020-08-04 PROCEDURE — 3077F PR MOST RECENT SYSTOLIC BLOOD PRESSURE >= 140 MM HG: ICD-10-PCS | Mod: CPTII,S$GLB,, | Performed by: NEUROLOGICAL SURGERY

## 2020-08-04 PROCEDURE — 99999 PR PBB SHADOW E&M-EST. PATIENT-LVL III: CPT | Mod: PBBFAC,,, | Performed by: NEUROLOGICAL SURGERY

## 2020-08-04 PROCEDURE — 70553 MRI BRAIN STEM W/O & W/DYE: CPT | Mod: TC

## 2020-08-04 PROCEDURE — 3077F SYST BP >= 140 MM HG: CPT | Mod: CPTII,S$GLB,, | Performed by: NEUROLOGICAL SURGERY

## 2020-08-04 PROCEDURE — 25500020 PHARM REV CODE 255: Performed by: NURSE PRACTITIONER

## 2020-08-04 PROCEDURE — A9585 GADOBUTROL INJECTION: HCPCS | Performed by: NURSE PRACTITIONER

## 2020-08-04 PROCEDURE — 3008F BODY MASS INDEX DOCD: CPT | Mod: CPTII,S$GLB,, | Performed by: NEUROLOGICAL SURGERY

## 2020-08-04 PROCEDURE — 3079F PR MOST RECENT DIASTOLIC BLOOD PRESSURE 80-89 MM HG: ICD-10-PCS | Mod: CPTII,S$GLB,, | Performed by: NEUROLOGICAL SURGERY

## 2020-08-04 PROCEDURE — 3008F PR BODY MASS INDEX (BMI) DOCUMENTED: ICD-10-PCS | Mod: CPTII,S$GLB,, | Performed by: NEUROLOGICAL SURGERY

## 2020-08-04 PROCEDURE — 99999 PR PBB SHADOW E&M-EST. PATIENT-LVL III: ICD-10-PCS | Mod: PBBFAC,,, | Performed by: NEUROLOGICAL SURGERY

## 2020-08-04 RX ORDER — GADOBUTROL 604.72 MG/ML
10 INJECTION INTRAVENOUS
Status: DISCONTINUED | OUTPATIENT
Start: 2020-08-04 | End: 2020-08-04

## 2020-08-04 RX ORDER — GADOBUTROL 604.72 MG/ML
5 INJECTION INTRAVENOUS
Status: COMPLETED | OUTPATIENT
Start: 2020-08-04 | End: 2020-08-04

## 2020-08-04 RX ADMIN — GADOBUTROL 5 ML: 604.72 INJECTION INTRAVENOUS at 12:08

## 2020-08-04 NOTE — PATIENT INSTRUCTIONS
I have personally reviewed the MRI Brain with the pt which shows stable postoperative changes from transphenoidal hypophysectomy of pituitary macroadenoma with adequate optic chiasmal decompression.     I will schedule the patient for 1 year follow up with MRI Brain.

## 2020-08-04 NOTE — PROGRESS NOTES
Subjective:   I, Brian Pena, attest that this documentation has been prepared under the direction and in the presence of Cali Reyes MD.     Patient ID: Paddy Wong is a 59 y.o. male     Chief Complaint: No chief complaint on file.          HPI  MrJosias Wong is a pleasant 59 y.o. gentleman with pituitary macroadenoma, s/p transphenoidal hypophysectomy of pituitary tumor (02/11/2016) who presents today for his 2 year follow up with MRI Brain. Pt was last seen in clinic on 07/03/2018, at which time he complained of constant fatigue and a decrease in his energy levels despite normal blood work. He admitted to sleeping 4-5 hours a night while snoring. Pt was advised to proceed with a sleep study.    Pt states he has been doing well in the interim, although his energy levels have been subpar.    Review of Systems   Constitutional: Positive for activity change (low). Negative for appetite change, fatigue, fever and unexpected weight change.   HENT: Negative for facial swelling.    Eyes: Negative.    Respiratory: Negative.    Cardiovascular: Negative.    Gastrointestinal: Negative for diarrhea, nausea and vomiting.   Endocrine: Negative.    Genitourinary: Negative.    Musculoskeletal: Negative for back pain, joint swelling, myalgias and neck pain.   Neurological: Negative for dizziness, seizures, weakness, numbness and headaches.   Psychiatric/Behavioral: Negative.       Past Medical History:   Diagnosis Date    Arthritis     Depression     HTN (hypertension) 1/24/2017    Hyperlipidemia     Liver disease     Obesity     Pituitary adenoma     Psychiatric exam requested by authority     Psychiatric problem     Sleep difficulties        Objective:      Vitals:    08/04/20 1359   BP: (!) 144/80   Pulse: 84   Temp: 98.4 °F (36.9 °C)      Physical Exam  HENT:      Head: Normocephalic and atraumatic.   Neck:      Musculoskeletal: Neck supple.   Neurological:      Mental Status: He is alert and oriented  to person, place, and time.      GCS: GCS eye subscore is 4. GCS verbal subscore is 5. GCS motor subscore is 6.      Cranial Nerves: No cranial nerve deficit.     He is moving all extremities quite well.  He has no neurological deficit.    IMAGING:  MRI BRAIN W WO CONTRAST (08/04/2020) shows stable postoperative changes from transphenoidal hypophysectomy of pituitary macroadenoma with adequate optic chiasmal decompression.       I have personally reviewed the images with the pt.      I, Dr. Cali Reyes, personally performed the services described in this documentation. All medical record entries made by the scribe, Brian Pena, were at my direction and in my presence.  I have reviewed the chart and agree that the record reflects my personal performance and is accurate and complete. Cali Reyes MD. 08/04/2020    Assessment:       Pituitary adenoma.   Plan:   I have personally reviewed the MRI Brain with the pt which shows stable postoperative changes from transphenoidal hypophysectomy of pituitary macroadenoma with adequate optic chiasmal decompression.     I will schedule the patient for 1 year follow up with MRI Brain.

## 2020-10-27 ENCOUNTER — TELEPHONE (OUTPATIENT)
Dept: VASCULAR SURGERY | Facility: CLINIC | Age: 60
End: 2020-10-27

## 2020-10-27 ENCOUNTER — PATIENT MESSAGE (OUTPATIENT)
Dept: VASCULAR SURGERY | Facility: CLINIC | Age: 60
End: 2020-10-27

## 2020-10-27 NOTE — TELEPHONE ENCOUNTER
Message was sent. Pt needs to be evaluated by his primary care provider to determine if he needs to see someone in vascular surgery.

## 2020-10-27 NOTE — TELEPHONE ENCOUNTER
----- Message from Thelma Anaya sent at 10/27/2020  7:02 AM CDT -----  Regarding: Appointment  Appointment Request From: Paddy Wong    With Provider: vascular doctor    Preferred Date Range: 10/26/2020 - 10/30/2020    Preferred Times: Any Time    Reason for visit: possible cellulitis    Comments:  for them to evaluate my leg

## 2020-11-06 ENCOUNTER — HOSPITAL ENCOUNTER (OUTPATIENT)
Dept: RADIOLOGY | Facility: HOSPITAL | Age: 60
Discharge: HOME | End: 2020-11-06
Attending: SURGERY
Payer: COMMERCIAL

## 2020-11-06 DIAGNOSIS — L03.116 CELLULITIS AND ABSCESS OF LEFT LEG: ICD-10-CM

## 2020-11-06 DIAGNOSIS — R22.43 LOCALIZED SWELLING OF BOTH LOWER LEGS: ICD-10-CM

## 2020-11-06 DIAGNOSIS — L02.416 CELLULITIS AND ABSCESS OF LEFT LEG: ICD-10-CM

## 2020-11-06 PROCEDURE — 93970 US LOWER EXTREMITY VEINS BILATERAL: ICD-10-PCS | Mod: 26,,, | Performed by: RADIOLOGY

## 2020-11-06 PROCEDURE — 93970 EXTREMITY STUDY: CPT | Mod: 26,,, | Performed by: RADIOLOGY

## 2020-11-06 PROCEDURE — 93970 EXTREMITY STUDY: CPT | Mod: TC

## 2020-11-17 PROBLEM — M79.662 PAIN AND SWELLING OF LEFT LOWER LEG: Status: ACTIVE | Noted: 2020-11-17

## 2020-11-17 PROBLEM — M79.89 PAIN AND SWELLING OF LEFT LOWER LEG: Status: ACTIVE | Noted: 2020-11-17

## 2021-04-16 ENCOUNTER — PATIENT MESSAGE (OUTPATIENT)
Dept: RESEARCH | Facility: HOSPITAL | Age: 61
End: 2021-04-16

## 2021-06-23 ENCOUNTER — TELEPHONE (OUTPATIENT)
Dept: INTERNAL MEDICINE | Facility: CLINIC | Age: 61
End: 2021-06-23

## 2021-06-23 ENCOUNTER — OFFICE VISIT (OUTPATIENT)
Dept: FAMILY MEDICINE | Facility: CLINIC | Age: 61
End: 2021-06-23
Payer: COMMERCIAL

## 2021-06-23 ENCOUNTER — HOSPITAL ENCOUNTER (OUTPATIENT)
Dept: RADIOLOGY | Facility: HOSPITAL | Age: 61
Discharge: HOME | End: 2021-06-23
Attending: FAMILY MEDICINE
Payer: COMMERCIAL

## 2021-06-23 VITALS
HEIGHT: 67 IN | HEART RATE: 71 BPM | SYSTOLIC BLOOD PRESSURE: 138 MMHG | WEIGHT: 290.38 LBS | BODY MASS INDEX: 45.57 KG/M2 | TEMPERATURE: 98 F | DIASTOLIC BLOOD PRESSURE: 80 MMHG | OXYGEN SATURATION: 98 %

## 2021-06-23 DIAGNOSIS — J41.1 MUCOPURULENT CHRONIC BRONCHITIS: ICD-10-CM

## 2021-06-23 DIAGNOSIS — J41.1 MUCOPURULENT CHRONIC BRONCHITIS: Primary | ICD-10-CM

## 2021-06-23 PROCEDURE — 3008F PR BODY MASS INDEX (BMI) DOCUMENTED: ICD-10-PCS | Mod: CPTII,S$GLB,, | Performed by: FAMILY MEDICINE

## 2021-06-23 PROCEDURE — 71046 XR CHEST PA AND LATERAL: ICD-10-PCS | Mod: 26,,, | Performed by: RADIOLOGY

## 2021-06-23 PROCEDURE — 99203 OFFICE O/P NEW LOW 30 MIN: CPT | Mod: S$GLB,,, | Performed by: FAMILY MEDICINE

## 2021-06-23 PROCEDURE — 71046 X-RAY EXAM CHEST 2 VIEWS: CPT | Mod: 26,,, | Performed by: RADIOLOGY

## 2021-06-23 PROCEDURE — 99999 PR PBB SHADOW E&M-EST. PATIENT-LVL IV: ICD-10-PCS | Mod: PBBFAC,,, | Performed by: FAMILY MEDICINE

## 2021-06-23 PROCEDURE — 99203 PR OFFICE/OUTPT VISIT, NEW, LEVL III, 30-44 MIN: ICD-10-PCS | Mod: S$GLB,,, | Performed by: FAMILY MEDICINE

## 2021-06-23 PROCEDURE — 1126F AMNT PAIN NOTED NONE PRSNT: CPT | Mod: S$GLB,,, | Performed by: FAMILY MEDICINE

## 2021-06-23 PROCEDURE — 71046 X-RAY EXAM CHEST 2 VIEWS: CPT | Mod: TC,FY,PO

## 2021-06-23 PROCEDURE — 1126F PR PAIN SEVERITY QUANTIFIED, NO PAIN PRESENT: ICD-10-PCS | Mod: S$GLB,,, | Performed by: FAMILY MEDICINE

## 2021-06-23 PROCEDURE — 99999 PR PBB SHADOW E&M-EST. PATIENT-LVL IV: CPT | Mod: PBBFAC,,, | Performed by: FAMILY MEDICINE

## 2021-06-23 PROCEDURE — 3008F BODY MASS INDEX DOCD: CPT | Mod: CPTII,S$GLB,, | Performed by: FAMILY MEDICINE

## 2021-06-23 RX ORDER — AZITHROMYCIN 250 MG/1
TABLET, FILM COATED ORAL
Qty: 6 TABLET | Refills: 0 | Status: SHIPPED | OUTPATIENT
Start: 2021-06-23 | End: 2021-09-29

## 2021-06-23 RX ORDER — FENOPROFEN CALCIUM 600 MG/1
TABLET, FILM COATED ORAL
COMMUNITY
Start: 2021-01-18

## 2021-06-23 RX ORDER — PANTOPRAZOLE SODIUM 40 MG/1
40 TABLET, DELAYED RELEASE ORAL EVERY MORNING
COMMUNITY
Start: 2021-05-06

## 2021-06-23 RX ORDER — PREDNISONE 5 MG/1
5 TABLET ORAL 2 TIMES DAILY
Qty: 6 TABLET | Refills: 0 | Status: SHIPPED | OUTPATIENT
Start: 2021-06-23 | End: 2021-06-26

## 2021-06-23 RX ORDER — PROMETHAZINE HYDROCHLORIDE AND DEXTROMETHORPHAN HYDROBROMIDE 6.25; 15 MG/5ML; MG/5ML
5 SYRUP ORAL 4 TIMES DAILY PRN
Qty: 240 ML | Refills: 0 | Status: SHIPPED | OUTPATIENT
Start: 2021-06-23 | End: 2021-07-03

## 2021-06-23 RX ORDER — ALBUTEROL SULFATE 90 UG/1
2 AEROSOL, METERED RESPIRATORY (INHALATION) EVERY 6 HOURS PRN
Qty: 18 G | Refills: 0 | Status: SHIPPED | OUTPATIENT
Start: 2021-06-23 | End: 2021-09-29

## 2021-07-20 ENCOUNTER — OFFICE VISIT (OUTPATIENT)
Dept: FAMILY MEDICINE | Facility: CLINIC | Age: 61
End: 2021-07-20
Payer: COMMERCIAL

## 2021-07-20 ENCOUNTER — HOSPITAL ENCOUNTER (OUTPATIENT)
Dept: RADIOLOGY | Facility: HOSPITAL | Age: 61
Discharge: HOME | End: 2021-07-20
Attending: FAMILY MEDICINE
Payer: COMMERCIAL

## 2021-07-20 ENCOUNTER — PATIENT OUTREACH (OUTPATIENT)
Dept: ADMINISTRATIVE | Facility: HOSPITAL | Age: 61
End: 2021-07-20

## 2021-07-20 VITALS
HEART RATE: 68 BPM | HEIGHT: 67 IN | WEIGHT: 285.94 LBS | OXYGEN SATURATION: 98 % | BODY MASS INDEX: 44.88 KG/M2 | SYSTOLIC BLOOD PRESSURE: 150 MMHG | DIASTOLIC BLOOD PRESSURE: 88 MMHG

## 2021-07-20 DIAGNOSIS — Z23 NEED FOR TETANUS, DIPHTHERIA, AND ACELLULAR PERTUSSIS (TDAP) VACCINE: ICD-10-CM

## 2021-07-20 DIAGNOSIS — D35.2 PITUITARY MACROADENOMA: ICD-10-CM

## 2021-07-20 DIAGNOSIS — M25.512 CHRONIC PAIN OF BOTH SHOULDERS: ICD-10-CM

## 2021-07-20 DIAGNOSIS — M25.511 CHRONIC PAIN OF BOTH SHOULDERS: ICD-10-CM

## 2021-07-20 DIAGNOSIS — Z97.3 WEARS GLASSES: ICD-10-CM

## 2021-07-20 DIAGNOSIS — G89.29 CHRONIC PAIN OF BOTH SHOULDERS: ICD-10-CM

## 2021-07-20 DIAGNOSIS — I87.2 VENOUS INSUFFICIENCY OF BOTH LOWER EXTREMITIES: ICD-10-CM

## 2021-07-20 DIAGNOSIS — E89.3 S/P TRANSSPHENOIDAL HYPOPHYSECTOMY: ICD-10-CM

## 2021-07-20 DIAGNOSIS — I10 ESSENTIAL HYPERTENSION: Primary | ICD-10-CM

## 2021-07-20 PROCEDURE — 73030 X-RAY EXAM OF SHOULDER: CPT | Mod: TC,50,FY,PO

## 2021-07-20 PROCEDURE — 90471 TDAP VACCINE GREATER THAN OR EQUAL TO 7YO IM: ICD-10-PCS | Mod: S$GLB,,, | Performed by: FAMILY MEDICINE

## 2021-07-20 PROCEDURE — 99999 PR PBB SHADOW E&M-EST. PATIENT-LVL IV: ICD-10-PCS | Mod: PBBFAC,,, | Performed by: FAMILY MEDICINE

## 2021-07-20 PROCEDURE — 73030 XR SHOULDER COMPLETE 2 OR MORE VIEWS BILATERAL: ICD-10-PCS | Mod: 26,,, | Performed by: RADIOLOGY

## 2021-07-20 PROCEDURE — 1126F AMNT PAIN NOTED NONE PRSNT: CPT | Mod: CPTII,S$GLB,, | Performed by: FAMILY MEDICINE

## 2021-07-20 PROCEDURE — 3008F BODY MASS INDEX DOCD: CPT | Mod: CPTII,S$GLB,, | Performed by: FAMILY MEDICINE

## 2021-07-20 PROCEDURE — 99214 OFFICE O/P EST MOD 30 MIN: CPT | Mod: 25,S$GLB,, | Performed by: FAMILY MEDICINE

## 2021-07-20 PROCEDURE — 3079F PR MOST RECENT DIASTOLIC BLOOD PRESSURE 80-89 MM HG: ICD-10-PCS | Mod: CPTII,S$GLB,, | Performed by: FAMILY MEDICINE

## 2021-07-20 PROCEDURE — 90715 TDAP VACCINE GREATER THAN OR EQUAL TO 7YO IM: ICD-10-PCS | Mod: S$GLB,,, | Performed by: FAMILY MEDICINE

## 2021-07-20 PROCEDURE — 3077F SYST BP >= 140 MM HG: CPT | Mod: CPTII,S$GLB,, | Performed by: FAMILY MEDICINE

## 2021-07-20 PROCEDURE — 99214 PR OFFICE/OUTPT VISIT, EST, LEVL IV, 30-39 MIN: ICD-10-PCS | Mod: 25,S$GLB,, | Performed by: FAMILY MEDICINE

## 2021-07-20 PROCEDURE — 3008F PR BODY MASS INDEX (BMI) DOCUMENTED: ICD-10-PCS | Mod: CPTII,S$GLB,, | Performed by: FAMILY MEDICINE

## 2021-07-20 PROCEDURE — 1126F PR PAIN SEVERITY QUANTIFIED, NO PAIN PRESENT: ICD-10-PCS | Mod: CPTII,S$GLB,, | Performed by: FAMILY MEDICINE

## 2021-07-20 PROCEDURE — 73030 X-RAY EXAM OF SHOULDER: CPT | Mod: 26,,, | Performed by: RADIOLOGY

## 2021-07-20 PROCEDURE — 3079F DIAST BP 80-89 MM HG: CPT | Mod: CPTII,S$GLB,, | Performed by: FAMILY MEDICINE

## 2021-07-20 PROCEDURE — 90471 IMMUNIZATION ADMIN: CPT | Mod: S$GLB,,, | Performed by: FAMILY MEDICINE

## 2021-07-20 PROCEDURE — 3077F PR MOST RECENT SYSTOLIC BLOOD PRESSURE >= 140 MM HG: ICD-10-PCS | Mod: CPTII,S$GLB,, | Performed by: FAMILY MEDICINE

## 2021-07-20 PROCEDURE — 90715 TDAP VACCINE 7 YRS/> IM: CPT | Mod: S$GLB,,, | Performed by: FAMILY MEDICINE

## 2021-07-20 PROCEDURE — 99999 PR PBB SHADOW E&M-EST. PATIENT-LVL IV: CPT | Mod: PBBFAC,,, | Performed by: FAMILY MEDICINE

## 2021-07-21 ENCOUNTER — LAB VISIT (OUTPATIENT)
Dept: LAB | Facility: HOSPITAL | Age: 61
End: 2021-07-21
Attending: FAMILY MEDICINE
Payer: COMMERCIAL

## 2021-07-21 DIAGNOSIS — I10 ESSENTIAL HYPERTENSION: ICD-10-CM

## 2021-07-21 LAB
ALBUMIN SERPL BCP-MCNC: 4 G/DL (ref 3.5–5.2)
ALP SERPL-CCNC: 95 U/L (ref 55–135)
ALT SERPL W/O P-5'-P-CCNC: 36 U/L (ref 10–44)
ANION GAP SERPL CALC-SCNC: 13 MMOL/L (ref 8–16)
AST SERPL-CCNC: 27 U/L (ref 10–40)
BASOPHILS # BLD AUTO: 0.08 K/UL (ref 0–0.2)
BASOPHILS NFR BLD: 0.8 % (ref 0–1.9)
BILIRUB SERPL-MCNC: 0.9 MG/DL (ref 0.1–1)
BUN SERPL-MCNC: 16 MG/DL (ref 6–20)
CALCIUM SERPL-MCNC: 9.7 MG/DL (ref 8.7–10.5)
CHLORIDE SERPL-SCNC: 108 MMOL/L (ref 95–110)
CHOLEST SERPL-MCNC: 190 MG/DL (ref 120–199)
CHOLEST/HDLC SERPL: 5.1 {RATIO} (ref 2–5)
CO2 SERPL-SCNC: 22 MMOL/L (ref 23–29)
CREAT SERPL-MCNC: 1 MG/DL (ref 0.5–1.4)
DIFFERENTIAL METHOD: ABNORMAL
EOSINOPHIL # BLD AUTO: 0.4 K/UL (ref 0–0.5)
EOSINOPHIL NFR BLD: 4 % (ref 0–8)
ERYTHROCYTE [DISTWIDTH] IN BLOOD BY AUTOMATED COUNT: 13.2 % (ref 11.5–14.5)
EST. GFR  (AFRICAN AMERICAN): >60 ML/MIN/1.73 M^2
EST. GFR  (NON AFRICAN AMERICAN): >60 ML/MIN/1.73 M^2
GLUCOSE SERPL-MCNC: 96 MG/DL (ref 70–110)
HCT VFR BLD AUTO: 49.6 % (ref 40–54)
HDLC SERPL-MCNC: 37 MG/DL (ref 40–75)
HDLC SERPL: 19.5 % (ref 20–50)
HGB BLD-MCNC: 16.3 G/DL (ref 14–18)
IMM GRANULOCYTES # BLD AUTO: 0.08 K/UL (ref 0–0.04)
IMM GRANULOCYTES NFR BLD AUTO: 0.8 % (ref 0–0.5)
LDLC SERPL CALC-MCNC: ABNORMAL MG/DL (ref 63–159)
LYMPHOCYTES # BLD AUTO: 3.6 K/UL (ref 1–4.8)
LYMPHOCYTES NFR BLD: 35.8 % (ref 18–48)
MCH RBC QN AUTO: 29.4 PG (ref 27–31)
MCHC RBC AUTO-ENTMCNC: 32.9 G/DL (ref 32–36)
MCV RBC AUTO: 89 FL (ref 82–98)
MONOCYTES # BLD AUTO: 0.9 K/UL (ref 0.3–1)
MONOCYTES NFR BLD: 9.2 % (ref 4–15)
NEUTROPHILS # BLD AUTO: 5 K/UL (ref 1.8–7.7)
NEUTROPHILS NFR BLD: 49.4 % (ref 38–73)
NONHDLC SERPL-MCNC: 153 MG/DL
NRBC BLD-RTO: 0 /100 WBC
PLATELET # BLD AUTO: 197 K/UL (ref 150–450)
PMV BLD AUTO: 13 FL (ref 9.2–12.9)
POTASSIUM SERPL-SCNC: 4.7 MMOL/L (ref 3.5–5.1)
PROT SERPL-MCNC: 7.3 G/DL (ref 6–8.4)
RBC # BLD AUTO: 5.55 M/UL (ref 4.6–6.2)
SODIUM SERPL-SCNC: 143 MMOL/L (ref 136–145)
TRIGL SERPL-MCNC: 546 MG/DL (ref 30–150)
TSH SERPL DL<=0.005 MIU/L-ACNC: 1.97 UIU/ML (ref 0.4–4)
WBC # BLD AUTO: 10.11 K/UL (ref 3.9–12.7)

## 2021-07-21 PROCEDURE — 80061 LIPID PANEL: CPT | Performed by: FAMILY MEDICINE

## 2021-07-21 PROCEDURE — 85025 COMPLETE CBC W/AUTO DIFF WBC: CPT | Performed by: FAMILY MEDICINE

## 2021-07-21 PROCEDURE — 36415 COLL VENOUS BLD VENIPUNCTURE: CPT | Mod: PO | Performed by: FAMILY MEDICINE

## 2021-07-21 PROCEDURE — 84443 ASSAY THYROID STIM HORMONE: CPT | Performed by: FAMILY MEDICINE

## 2021-07-21 PROCEDURE — 80053 COMPREHEN METABOLIC PANEL: CPT | Performed by: FAMILY MEDICINE

## 2021-07-26 ENCOUNTER — PATIENT OUTREACH (OUTPATIENT)
Dept: ADMINISTRATIVE | Facility: OTHER | Age: 61
End: 2021-07-26

## 2021-07-26 DIAGNOSIS — Z12.11 ENCOUNTER FOR FIT (FECAL IMMUNOCHEMICAL TEST) SCREENING: Primary | ICD-10-CM

## 2021-07-27 ENCOUNTER — OFFICE VISIT (OUTPATIENT)
Dept: OPTOMETRY | Facility: CLINIC | Age: 61
End: 2021-07-27
Payer: COMMERCIAL

## 2021-07-27 DIAGNOSIS — Z97.3 WEARS GLASSES: ICD-10-CM

## 2021-07-27 DIAGNOSIS — D35.2 PITUITARY MACROADENOMA: Primary | ICD-10-CM

## 2021-07-27 DIAGNOSIS — H52.4 BILATERAL PRESBYOPIA: ICD-10-CM

## 2021-07-27 PROCEDURE — 1160F PR REVIEW ALL MEDS BY PRESCRIBER/CLIN PHARMACIST DOCUMENTED: ICD-10-PCS | Mod: CPTII,S$GLB,, | Performed by: OPTOMETRIST

## 2021-07-27 PROCEDURE — 1159F PR MEDICATION LIST DOCUMENTED IN MEDICAL RECORD: ICD-10-PCS | Mod: CPTII,S$GLB,, | Performed by: OPTOMETRIST

## 2021-07-27 PROCEDURE — 99999 PR PBB SHADOW E&M-EST. PATIENT-LVL II: CPT | Mod: PBBFAC,,, | Performed by: OPTOMETRIST

## 2021-07-27 PROCEDURE — 92015 PR REFRACTION: ICD-10-PCS | Mod: S$GLB,,, | Performed by: OPTOMETRIST

## 2021-07-27 PROCEDURE — 1159F MED LIST DOCD IN RCRD: CPT | Mod: CPTII,S$GLB,, | Performed by: OPTOMETRIST

## 2021-07-27 PROCEDURE — 92004 COMPRE OPH EXAM NEW PT 1/>: CPT | Mod: S$GLB,,, | Performed by: OPTOMETRIST

## 2021-07-27 PROCEDURE — 92015 DETERMINE REFRACTIVE STATE: CPT | Mod: S$GLB,,, | Performed by: OPTOMETRIST

## 2021-07-27 PROCEDURE — 1160F RVW MEDS BY RX/DR IN RCRD: CPT | Mod: CPTII,S$GLB,, | Performed by: OPTOMETRIST

## 2021-07-27 PROCEDURE — 92004 PR EYE EXAM, NEW PATIENT,COMPREHESV: ICD-10-PCS | Mod: S$GLB,,, | Performed by: OPTOMETRIST

## 2021-07-27 PROCEDURE — 99999 PR PBB SHADOW E&M-EST. PATIENT-LVL II: ICD-10-PCS | Mod: PBBFAC,,, | Performed by: OPTOMETRIST

## 2021-08-17 ENCOUNTER — PATIENT MESSAGE (OUTPATIENT)
Dept: ADMINISTRATIVE | Facility: HOSPITAL | Age: 61
End: 2021-08-17

## 2021-08-17 ENCOUNTER — PATIENT OUTREACH (OUTPATIENT)
Dept: ADMINISTRATIVE | Facility: HOSPITAL | Age: 61
End: 2021-08-17

## 2021-09-28 ENCOUNTER — OFFICE VISIT (OUTPATIENT)
Dept: FAMILY MEDICINE | Facility: CLINIC | Age: 61
End: 2021-09-28
Payer: COMMERCIAL

## 2021-09-28 VITALS
WEIGHT: 276.25 LBS | OXYGEN SATURATION: 97 % | BODY MASS INDEX: 43.26 KG/M2 | DIASTOLIC BLOOD PRESSURE: 82 MMHG | HEART RATE: 70 BPM | SYSTOLIC BLOOD PRESSURE: 122 MMHG

## 2021-09-28 DIAGNOSIS — E66.01 MORBID OBESITY WITH BMI OF 40.0-44.9, ADULT: ICD-10-CM

## 2021-09-28 DIAGNOSIS — Z01.84 ENCOUNTER FOR ANTIBODY RESPONSE EXAMINATION: ICD-10-CM

## 2021-09-28 DIAGNOSIS — I10 ESSENTIAL HYPERTENSION: Primary | ICD-10-CM

## 2021-09-28 DIAGNOSIS — E78.1 HYPERTRIGLYCERIDEMIA: ICD-10-CM

## 2021-09-28 PROCEDURE — 3079F DIAST BP 80-89 MM HG: CPT | Mod: CPTII,S$GLB,, | Performed by: FAMILY MEDICINE

## 2021-09-28 PROCEDURE — 3074F PR MOST RECENT SYSTOLIC BLOOD PRESSURE < 130 MM HG: ICD-10-PCS | Mod: CPTII,S$GLB,, | Performed by: FAMILY MEDICINE

## 2021-09-28 PROCEDURE — 99214 PR OFFICE/OUTPT VISIT, EST, LEVL IV, 30-39 MIN: ICD-10-PCS | Mod: S$GLB,,, | Performed by: FAMILY MEDICINE

## 2021-09-28 PROCEDURE — 3008F PR BODY MASS INDEX (BMI) DOCUMENTED: ICD-10-PCS | Mod: CPTII,S$GLB,, | Performed by: FAMILY MEDICINE

## 2021-09-28 PROCEDURE — 99999 PR PBB SHADOW E&M-EST. PATIENT-LVL III: CPT | Mod: PBBFAC,,, | Performed by: FAMILY MEDICINE

## 2021-09-28 PROCEDURE — 99214 OFFICE O/P EST MOD 30 MIN: CPT | Mod: S$GLB,,, | Performed by: FAMILY MEDICINE

## 2021-09-28 PROCEDURE — 1159F MED LIST DOCD IN RCRD: CPT | Mod: CPTII,S$GLB,, | Performed by: FAMILY MEDICINE

## 2021-09-28 PROCEDURE — 99999 PR PBB SHADOW E&M-EST. PATIENT-LVL III: ICD-10-PCS | Mod: PBBFAC,,, | Performed by: FAMILY MEDICINE

## 2021-09-28 PROCEDURE — 1159F PR MEDICATION LIST DOCUMENTED IN MEDICAL RECORD: ICD-10-PCS | Mod: CPTII,S$GLB,, | Performed by: FAMILY MEDICINE

## 2021-09-28 PROCEDURE — 3008F BODY MASS INDEX DOCD: CPT | Mod: CPTII,S$GLB,, | Performed by: FAMILY MEDICINE

## 2021-09-28 PROCEDURE — 3079F PR MOST RECENT DIASTOLIC BLOOD PRESSURE 80-89 MM HG: ICD-10-PCS | Mod: CPTII,S$GLB,, | Performed by: FAMILY MEDICINE

## 2021-09-28 PROCEDURE — 3074F SYST BP LT 130 MM HG: CPT | Mod: CPTII,S$GLB,, | Performed by: FAMILY MEDICINE

## 2021-09-28 RX ORDER — DIAZEPAM 5 MG/1
TABLET ORAL
COMMUNITY
Start: 2021-07-28

## 2021-09-29 ENCOUNTER — LAB VISIT (OUTPATIENT)
Dept: LAB | Facility: HOSPITAL | Age: 61
End: 2021-09-29
Attending: FAMILY MEDICINE
Payer: COMMERCIAL

## 2021-09-29 DIAGNOSIS — Z01.84 ENCOUNTER FOR ANTIBODY RESPONSE EXAMINATION: ICD-10-CM

## 2021-09-29 DIAGNOSIS — E78.1 HYPERTRIGLYCERIDEMIA: ICD-10-CM

## 2021-09-29 LAB
CHOLEST SERPL-MCNC: 212 MG/DL (ref 120–199)
CHOLEST/HDLC SERPL: 4.3 {RATIO} (ref 2–5)
HDLC SERPL-MCNC: 49 MG/DL (ref 40–75)
HDLC SERPL: 23.1 % (ref 20–50)
LDLC SERPL CALC-MCNC: 107.2 MG/DL (ref 63–159)
NONHDLC SERPL-MCNC: 163 MG/DL
SARS-COV-2 IGG SERPL IA-ACNC: <50 AU/ML
SARS-COV-2 IGG SERPL QL IA: NEGATIVE
TRIGL SERPL-MCNC: 279 MG/DL (ref 30–150)

## 2021-09-29 PROCEDURE — 80061 LIPID PANEL: CPT | Performed by: FAMILY MEDICINE

## 2021-09-29 PROCEDURE — 86769 SARS-COV-2 COVID-19 ANTIBODY: CPT | Performed by: FAMILY MEDICINE

## 2021-09-29 PROCEDURE — 36415 COLL VENOUS BLD VENIPUNCTURE: CPT | Mod: PO | Performed by: FAMILY MEDICINE

## 2021-10-04 ENCOUNTER — PATIENT MESSAGE (OUTPATIENT)
Dept: ADMINISTRATIVE | Facility: HOSPITAL | Age: 61
End: 2021-10-04

## 2021-12-06 ENCOUNTER — NURSE TRIAGE (OUTPATIENT)
Dept: ADMINISTRATIVE | Facility: CLINIC | Age: 61
End: 2021-12-06
Payer: COMMERCIAL

## 2021-12-06 ENCOUNTER — TELEPHONE (OUTPATIENT)
Dept: FAMILY MEDICINE | Facility: CLINIC | Age: 61
End: 2021-12-06
Payer: COMMERCIAL

## 2021-12-06 ENCOUNTER — OFFICE VISIT (OUTPATIENT)
Dept: FAMILY MEDICINE | Facility: CLINIC | Age: 61
End: 2021-12-06
Payer: COMMERCIAL

## 2021-12-06 VITALS
WEIGHT: 269.63 LBS | HEART RATE: 86 BPM | OXYGEN SATURATION: 96 % | TEMPERATURE: 98 F | HEIGHT: 67 IN | SYSTOLIC BLOOD PRESSURE: 138 MMHG | DIASTOLIC BLOOD PRESSURE: 82 MMHG | BODY MASS INDEX: 42.32 KG/M2

## 2021-12-06 DIAGNOSIS — R68.83 CHILLS: ICD-10-CM

## 2021-12-06 DIAGNOSIS — R52 BODY ACHES: ICD-10-CM

## 2021-12-06 DIAGNOSIS — U07.1 COVID-19: Primary | ICD-10-CM

## 2021-12-06 DIAGNOSIS — R05.9 COUGH: ICD-10-CM

## 2021-12-06 LAB
CTP QC/QA: YES
CTP QC/QA: YES
FLUAV AG NPH QL: NEGATIVE
FLUBV AG NPH QL: NEGATIVE
SARS-COV-2 RDRP RESP QL NAA+PROBE: POSITIVE

## 2021-12-06 PROCEDURE — U0002 COVID-19 LAB TEST NON-CDC: HCPCS | Mod: QW,S$GLB,, | Performed by: FAMILY MEDICINE

## 2021-12-06 PROCEDURE — 99999 PR PBB SHADOW E&M-EST. PATIENT-LVL III: CPT | Mod: PBBFAC,,, | Performed by: FAMILY MEDICINE

## 2021-12-06 PROCEDURE — 99214 OFFICE O/P EST MOD 30 MIN: CPT | Mod: 25,S$GLB,, | Performed by: FAMILY MEDICINE

## 2021-12-06 PROCEDURE — 87804 POCT INFLUENZA A/B: ICD-10-PCS | Mod: QW,S$GLB,, | Performed by: FAMILY MEDICINE

## 2021-12-06 PROCEDURE — 99999 PR PBB SHADOW E&M-EST. PATIENT-LVL III: ICD-10-PCS | Mod: PBBFAC,,, | Performed by: FAMILY MEDICINE

## 2021-12-06 PROCEDURE — U0002: ICD-10-PCS | Mod: QW,S$GLB,, | Performed by: FAMILY MEDICINE

## 2021-12-06 PROCEDURE — 99214 PR OFFICE/OUTPT VISIT, EST, LEVL IV, 30-39 MIN: ICD-10-PCS | Mod: 25,S$GLB,, | Performed by: FAMILY MEDICINE

## 2021-12-06 PROCEDURE — 87804 INFLUENZA ASSAY W/OPTIC: CPT | Mod: QW,S$GLB,, | Performed by: FAMILY MEDICINE

## 2021-12-06 RX ORDER — PROMETHAZINE HYDROCHLORIDE AND DEXTROMETHORPHAN HYDROBROMIDE 6.25; 15 MG/5ML; MG/5ML
5 SYRUP ORAL EVERY 4 HOURS PRN
Qty: 118 ML | Refills: 0 | Status: SHIPPED | OUTPATIENT
Start: 2021-12-06 | End: 2021-12-16

## 2021-12-06 RX ORDER — PREDNISONE 20 MG/1
40 TABLET ORAL DAILY
Qty: 10 TABLET | Refills: 0 | Status: SHIPPED | OUTPATIENT
Start: 2021-12-06 | End: 2021-12-11

## 2021-12-07 ENCOUNTER — NURSE TRIAGE (OUTPATIENT)
Dept: ADMINISTRATIVE | Facility: CLINIC | Age: 61
End: 2021-12-07
Payer: COMMERCIAL

## 2022-05-31 ENCOUNTER — PATIENT MESSAGE (OUTPATIENT)
Dept: ADMINISTRATIVE | Facility: HOSPITAL | Age: 62
End: 2022-05-31
Payer: COMMERCIAL

## 2022-09-14 DIAGNOSIS — I10 HTN (HYPERTENSION): ICD-10-CM

## 2022-12-30 ENCOUNTER — PATIENT OUTREACH (OUTPATIENT)
Dept: ADMINISTRATIVE | Facility: HOSPITAL | Age: 62
End: 2022-12-30
Payer: COMMERCIAL

## 2022-12-30 ENCOUNTER — PATIENT MESSAGE (OUTPATIENT)
Dept: ADMINISTRATIVE | Facility: HOSPITAL | Age: 62
End: 2022-12-30
Payer: COMMERCIAL

## 2022-12-30 NOTE — PROGRESS NOTES
12/30/2022 Gap report audit performed. Care Everywhere updates requested and reviewed  Overdue HM topic chart audit and/or requested. LINKS triggered and reconciled. Media reviewed  Patient outreach regarding Health Maintenance- To Schedule Appt - left VM /portal message sent     Health Maintenance Due   Topic Date Due    COVID-19 Vaccine (1) Never done    Pneumococcal Vaccines (Age 0-64) (1 - PCV) Never done    HIV Screening  Never done    Colorectal Cancer Screening  01/26/2015    Influenza Vaccine (1) 09/01/2022    Hemoglobin A1c (Diabetic Prevention Screening)  11/02/2022

## 2023-06-21 ENCOUNTER — PATIENT MESSAGE (OUTPATIENT)
Dept: ADMINISTRATIVE | Facility: HOSPITAL | Age: 63
End: 2023-06-21
Payer: COMMERCIAL

## 2024-01-23 ENCOUNTER — TELEPHONE (OUTPATIENT)
Dept: NEUROSURGERY | Facility: CLINIC | Age: 64
End: 2024-01-23
Payer: COMMERCIAL

## 2024-01-24 NOTE — TELEPHONE ENCOUNTER
Pt left message about what order was needed. LVM informing pt it is MRI Brain with and without contrast. LVM if any other questions.

## 2024-01-25 ENCOUNTER — LAB VISIT (OUTPATIENT)
Dept: LAB | Facility: HOSPITAL | Age: 64
End: 2024-01-25
Attending: FAMILY MEDICINE
Payer: COMMERCIAL

## 2024-01-25 DIAGNOSIS — H53.10 EYE STRAIN: Primary | ICD-10-CM

## 2024-01-25 DIAGNOSIS — D49.7 PITUITARY TUMOR: ICD-10-CM

## 2024-01-25 DIAGNOSIS — H53.10 EYE STRAIN: ICD-10-CM

## 2024-01-25 LAB
ALBUMIN SERPL BCP-MCNC: 3.7 G/DL (ref 3.5–5.2)
ALP SERPL-CCNC: 65 U/L (ref 55–135)
ALT SERPL W/O P-5'-P-CCNC: 31 U/L (ref 10–44)
ANION GAP SERPL CALC-SCNC: 11 MMOL/L (ref 8–16)
AST SERPL-CCNC: 22 U/L (ref 10–40)
BASOPHILS # BLD AUTO: 0.08 K/UL (ref 0–0.2)
BASOPHILS NFR BLD: 0.7 % (ref 0–1.9)
BILIRUB SERPL-MCNC: 0.8 MG/DL (ref 0.1–1)
BUN SERPL-MCNC: 10 MG/DL (ref 8–23)
CALCIUM SERPL-MCNC: 9.4 MG/DL (ref 8.7–10.5)
CHLORIDE SERPL-SCNC: 107 MMOL/L (ref 95–110)
CO2 SERPL-SCNC: 24 MMOL/L (ref 23–29)
CREAT SERPL-MCNC: 0.9 MG/DL (ref 0.5–1.4)
DIFFERENTIAL METHOD BLD: ABNORMAL
EOSINOPHIL # BLD AUTO: 0.6 K/UL (ref 0–0.5)
EOSINOPHIL NFR BLD: 5.4 % (ref 0–8)
ERYTHROCYTE [DISTWIDTH] IN BLOOD BY AUTOMATED COUNT: 13.7 % (ref 11.5–14.5)
EST. GFR  (NO RACE VARIABLE): >60 ML/MIN/1.73 M^2
GLUCOSE SERPL-MCNC: 94 MG/DL (ref 70–110)
HCT VFR BLD AUTO: 49.1 % (ref 40–54)
HGB BLD-MCNC: 16.1 G/DL (ref 14–18)
IMM GRANULOCYTES # BLD AUTO: 0.09 K/UL (ref 0–0.04)
IMM GRANULOCYTES NFR BLD AUTO: 0.8 % (ref 0–0.5)
LYMPHOCYTES # BLD AUTO: 4.2 K/UL (ref 1–4.8)
LYMPHOCYTES NFR BLD: 37.2 % (ref 18–48)
MCH RBC QN AUTO: 30.5 PG (ref 27–31)
MCHC RBC AUTO-ENTMCNC: 32.8 G/DL (ref 32–36)
MCV RBC AUTO: 93 FL (ref 82–98)
MONOCYTES # BLD AUTO: 1 K/UL (ref 0.3–1)
MONOCYTES NFR BLD: 8.6 % (ref 4–15)
NEUTROPHILS # BLD AUTO: 5.4 K/UL (ref 1.8–7.7)
NEUTROPHILS NFR BLD: 47.3 % (ref 38–73)
NRBC BLD-RTO: 0 /100 WBC
PLATELET # BLD AUTO: 196 K/UL (ref 150–450)
PMV BLD AUTO: 13 FL (ref 9.2–12.9)
POTASSIUM SERPL-SCNC: 4.8 MMOL/L (ref 3.5–5.1)
PROT SERPL-MCNC: 6.9 G/DL (ref 6–8.4)
RBC # BLD AUTO: 5.28 M/UL (ref 4.6–6.2)
SODIUM SERPL-SCNC: 142 MMOL/L (ref 136–145)
TSH SERPL DL<=0.005 MIU/L-ACNC: 1.73 UIU/ML (ref 0.4–4)
WBC # BLD AUTO: 11.36 K/UL (ref 3.9–12.7)

## 2024-01-25 PROCEDURE — 84443 ASSAY THYROID STIM HORMONE: CPT | Performed by: FAMILY MEDICINE

## 2024-01-25 PROCEDURE — 80053 COMPREHEN METABOLIC PANEL: CPT | Performed by: FAMILY MEDICINE

## 2024-01-25 PROCEDURE — 85025 COMPLETE CBC W/AUTO DIFF WBC: CPT | Performed by: FAMILY MEDICINE

## 2024-01-25 PROCEDURE — 36415 COLL VENOUS BLD VENIPUNCTURE: CPT | Mod: PO | Performed by: FAMILY MEDICINE

## 2024-02-09 DIAGNOSIS — D35.2 BENIGN NEOPLASM OF PITUITARY GLAND AND CRANIOPHARYNGEAL DUCT (POUCH): Primary | ICD-10-CM

## 2024-02-09 DIAGNOSIS — D35.3 BENIGN NEOPLASM OF PITUITARY GLAND AND CRANIOPHARYNGEAL DUCT (POUCH): Primary | ICD-10-CM

## 2024-02-20 ENCOUNTER — HOSPITAL ENCOUNTER (OUTPATIENT)
Dept: RADIOLOGY | Facility: HOSPITAL | Age: 64
Discharge: HOME | End: 2024-02-20
Attending: FAMILY MEDICINE
Payer: COMMERCIAL

## 2024-02-20 DIAGNOSIS — D35.3 BENIGN NEOPLASM OF PITUITARY GLAND AND CRANIOPHARYNGEAL DUCT (POUCH): ICD-10-CM

## 2024-02-20 DIAGNOSIS — D35.2 BENIGN NEOPLASM OF PITUITARY GLAND AND CRANIOPHARYNGEAL DUCT (POUCH): ICD-10-CM

## 2024-02-20 PROCEDURE — 70553 MRI BRAIN STEM W/O & W/DYE: CPT | Mod: TC

## 2024-02-20 PROCEDURE — 70553 MRI BRAIN STEM W/O & W/DYE: CPT | Mod: 26,,, | Performed by: RADIOLOGY

## 2024-02-20 PROCEDURE — 25500020 PHARM REV CODE 255

## 2024-02-20 PROCEDURE — A9585 GADOBUTROL INJECTION: HCPCS

## 2024-02-20 RX ORDER — GADOBUTROL 604.72 MG/ML
5 INJECTION INTRAVENOUS
Status: COMPLETED | OUTPATIENT
Start: 2024-02-20 | End: 2024-02-20

## 2024-02-20 RX ADMIN — GADOBUTROL 5 ML: 604.72 INJECTION INTRAVENOUS at 01:02

## 2024-02-23 ENCOUNTER — TELEPHONE (OUTPATIENT)
Dept: RADIATION THERAPY | Facility: HOSPITAL | Age: 64
End: 2024-02-23
Payer: COMMERCIAL

## 2024-02-23 ENCOUNTER — OFFICE VISIT (OUTPATIENT)
Dept: NEUROSURGERY | Facility: CLINIC | Age: 64
End: 2024-02-23
Payer: COMMERCIAL

## 2024-02-23 ENCOUNTER — TELEPHONE (OUTPATIENT)
Dept: OPHTHALMOLOGY | Facility: CLINIC | Age: 64
End: 2024-02-23
Payer: COMMERCIAL

## 2024-02-23 VITALS
SYSTOLIC BLOOD PRESSURE: 164 MMHG | HEART RATE: 68 BPM | BODY MASS INDEX: 42.23 KG/M2 | DIASTOLIC BLOOD PRESSURE: 90 MMHG | HEIGHT: 67 IN

## 2024-02-23 DIAGNOSIS — E89.3 S/P TRANSSPHENOIDAL HYPOPHYSECTOMY: ICD-10-CM

## 2024-02-23 DIAGNOSIS — D35.2 PITUITARY MACROADENOMA: Primary | ICD-10-CM

## 2024-02-23 PROCEDURE — 1159F MED LIST DOCD IN RCRD: CPT | Mod: CPTII,S$GLB,, | Performed by: NEUROLOGICAL SURGERY

## 2024-02-23 PROCEDURE — 99214 OFFICE O/P EST MOD 30 MIN: CPT | Mod: S$GLB,,, | Performed by: NEUROLOGICAL SURGERY

## 2024-02-23 PROCEDURE — 99999 PR PBB SHADOW E&M-EST. PATIENT-LVL III: CPT | Mod: PBBFAC,,, | Performed by: NEUROLOGICAL SURGERY

## 2024-02-23 PROCEDURE — 3077F SYST BP >= 140 MM HG: CPT | Mod: CPTII,S$GLB,, | Performed by: NEUROLOGICAL SURGERY

## 2024-02-23 PROCEDURE — 3008F BODY MASS INDEX DOCD: CPT | Mod: CPTII,S$GLB,, | Performed by: NEUROLOGICAL SURGERY

## 2024-02-23 PROCEDURE — 1160F RVW MEDS BY RX/DR IN RCRD: CPT | Mod: CPTII,S$GLB,, | Performed by: NEUROLOGICAL SURGERY

## 2024-02-23 PROCEDURE — 3080F DIAST BP >= 90 MM HG: CPT | Mod: CPTII,S$GLB,, | Performed by: NEUROLOGICAL SURGERY

## 2024-02-23 NOTE — PATIENT INSTRUCTIONS
I have personally reviewed the MRI brain with the pt which shows MR sella: Remote operative change transsphenoidal sellar lesion resection. Continue though slight progressively increased sized heterogeneous hypoenhancing focus within the sella measures approximately 0.7 cm craniocaudal overall concerning for worsening residual recurrent lesion. No significant mass effect on the suprasellar neurovascular structures. Clinical correlation advised. MRI brain: Stable subcentimeter extra-axial enhancing lesion overlying the left frontal lobe remains concerning for meningioma. Otherwise unremarkable MRI of the brain specifically without evidence for hydrocephalus or new parenchymal signal abnormality.    Given that there is no mass effect on the optic chiasm, I do not recommend open surgery. I recommend GKSRS for pituitary recurrence. I have discussed the risks/benefits, indications, and alternatives for the proposed procedure in detail. I have answered all of their questions and patient wish to proceed with surgery. We will schedule patient.     I will also refer the pt to neuro-ophthalmology for HVF.

## 2024-02-23 NOTE — TELEPHONE ENCOUNTER
----- Message from Nicki An sent at 2/23/2024 10:08 AM CST -----  Good morning,    The patient have a referral from neurosurgery with a diagnosis of Eye strain/Pituitary tumor. Please assist with scheduling the patient.    Thank You

## 2024-02-23 NOTE — PROGRESS NOTES
Subjective:   I, Bridgett Marks, attest that this documentation has been prepared under the direction and in the presence of Cali Reyes MD.     Patient ID: Paddy Wong is a 63 y.o. male     Chief Complaint: No chief complaint on file.      HPI  MrJosias Wong is a 63 y.o. gentleman with pituitary macroadenoma, s/p transphenoidal hypophysectomy of pituitary tumor done 02/11/2016. Patient presents today for follow up. Patient was last seen in clinic on 8/4/2020, at which time the pt stated he has been doing well in the interim, although his energy levels had been subpar. More recently, the pt reported acute blurry vision and headaches, prompting PCP to order MRI of the brain. Imaging showed pituitary recurrence. He is here today for consideration of treatment options. Denies all other symptoms at this time.     Review of Systems   Constitutional:  Negative for activity change, appetite change, fatigue, fever and unexpected weight change.   HENT:  Negative for facial swelling.    Eyes:  Positive for visual disturbance.   Respiratory: Negative.     Cardiovascular: Negative.    Gastrointestinal:  Negative for diarrhea, nausea and vomiting.   Endocrine: Negative.    Genitourinary: Negative.    Musculoskeletal:  Negative for back pain, joint swelling, myalgias and neck pain.   Neurological:  Positive for headaches. Negative for dizziness, seizures, weakness and numbness.   Psychiatric/Behavioral: Negative.          Past Medical History:   Diagnosis Date    Arthritis     Depression     HTN (hypertension) 1/24/2017    Hyperlipidemia     Liver disease     Obesity     Pituitary adenoma     Psychiatric exam requested by authority     Psychiatric problem     Sleep difficulties        Objective:      Vitals:    02/23/24 0857   BP: (!) 164/90   Pulse: 68      Physical Exam  Constitutional:       General: He is not in acute distress.     Appearance: Normal appearance.   HENT:      Head: Normocephalic and atraumatic.    Pulmonary:      Effort: Pulmonary effort is normal.   Musculoskeletal:      Cervical back: Neck supple.   Neurological:      Mental Status: He is alert and oriented to person, place, and time.      GCS: GCS eye subscore is 4. GCS verbal subscore is 5. GCS motor subscore is 6.      Cranial Nerves: No cranial nerve deficit.            IMAGING:  MRI Brain W WO Contrast (2/20/2024):  MR sella: Remote operative change transsphenoidal sellar lesion resection. Continue though slight progressively increased sized heterogeneous hypoenhancing focus within the sella measures approximately 0.7 cm craniocaudal overall concerning for worsening residual recurrent lesion. No significant mass effect on the suprasellar neurovascular structures. Clinical correlation advised. MRI brain: Stable subcentimeter extra-axial enhancing lesion overlying the left frontal lobe remains concerning for meningioma. Otherwise unremarkable MRI of the brain specifically without evidence for hydrocephalus or new parenchymal signal abnormality      I have personally reviewed the images with the pt.      I, Dr. Cali Reyes, personally performed the services described in this documentation. All medical record entries made by the scribe, Bridgett Marks, were at my direction and in my presence.  I have reviewed the chart and agree that the record reflects my personal performance and is accurate and complete. Cali Reyes MD. 02/23/2024    Assessment:       Pituitary tumor.     Plan:   I have personally reviewed the MRI brain with the pt which shows MR sella: Remote operative change transsphenoidal sellar lesion resection. Continue though slight progressively increased sized heterogeneous hypoenhancing focus within the sella measures approximately 0.7 cm craniocaudal overall concerning for worsening residual recurrent lesion. No significant mass effect on the suprasellar neurovascular structures. Clinical correlation advised. MRI brain: Stable subcentimeter  extra-axial enhancing lesion overlying the left frontal lobe remains concerning for meningioma. Otherwise unremarkable MRI of the brain specifically without evidence for hydrocephalus or new parenchymal signal abnormality.    Given that there is no mass effect on the optic chiasm, I do not recommend open surgery. I recommend GKSRS for pituitary recurrence. I have discussed the risks/benefits, indications, and alternatives for the proposed procedure in detail. I have answered all of their questions and patient wish to proceed with surgery. We will schedule patient.     I will also refer the pt to neuro-ophthalmology for HVF.

## 2024-02-27 ENCOUNTER — OFFICE VISIT (OUTPATIENT)
Dept: RADIATION ONCOLOGY | Facility: CLINIC | Age: 64
End: 2024-02-27
Payer: COMMERCIAL

## 2024-02-27 VITALS
WEIGHT: 282.88 LBS | HEIGHT: 67 IN | HEART RATE: 63 BPM | SYSTOLIC BLOOD PRESSURE: 162 MMHG | DIASTOLIC BLOOD PRESSURE: 84 MMHG | BODY MASS INDEX: 44.4 KG/M2 | OXYGEN SATURATION: 98 %

## 2024-02-27 DIAGNOSIS — D35.2 BENIGN NEOPLASM OF PITUITARY GLAND AND CRANIOPHARYNGEAL DUCT (POUCH): ICD-10-CM

## 2024-02-27 DIAGNOSIS — D35.3 BENIGN NEOPLASM OF PITUITARY GLAND AND CRANIOPHARYNGEAL DUCT (POUCH): ICD-10-CM

## 2024-02-27 DIAGNOSIS — D35.2 PITUITARY MACROADENOMA: Primary | ICD-10-CM

## 2024-02-27 PROCEDURE — 3008F BODY MASS INDEX DOCD: CPT | Mod: CPTII,S$GLB,, | Performed by: RADIOLOGY

## 2024-02-27 PROCEDURE — 3079F DIAST BP 80-89 MM HG: CPT | Mod: CPTII,S$GLB,, | Performed by: RADIOLOGY

## 2024-02-27 PROCEDURE — 99999 PR PBB SHADOW E&M-EST. PATIENT-LVL III: CPT | Mod: PBBFAC,,, | Performed by: RADIOLOGY

## 2024-02-27 PROCEDURE — 99205 OFFICE O/P NEW HI 60 MIN: CPT | Mod: S$GLB,,, | Performed by: RADIOLOGY

## 2024-02-27 PROCEDURE — 1159F MED LIST DOCD IN RCRD: CPT | Mod: CPTII,S$GLB,, | Performed by: RADIOLOGY

## 2024-02-27 PROCEDURE — 3077F SYST BP >= 140 MM HG: CPT | Mod: CPTII,S$GLB,, | Performed by: RADIOLOGY

## 2024-02-27 NOTE — PROGRESS NOTES
2/27/2024    Radiation Oncology Consultation    Assessment   This is a 63 y.o. 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 He is referred for consideration of radiosurgery.    I discussed the natural history and treatment options available for pituitary adenomas. I recommend treating the residual enhancing tissue in the sella to 18-20 Gy x1. Potential side effects including HA, N/V, and impaired pituitary function were reviewed. At the end of our discussion, he was in agreement with proceeding with the recommended treatment.        Plan   1) Schedule Gamma Knife radiosurgery.        CHIEF COMPLAINT: Recurrent pituitary adenoma    HPI/Focused ROS: Mr. Wong is a 64 y/o male with h/o pituitary adenoma s/p transphenoidal resection 2/11/16 by Dr. Reyes with pathology revealing gonadotroph adenoma. There was small residual enhancing component in the sella noted on MRI Brain 6/7/16 though could not differentiate residual pituitary vs adenoma. This remained stable on imaging through MRI Brain 8/4/20. He was lost to follow-up after this until early 2024 when he noted visual change and HA. MRI Brain 2/20/24 demonstrated slight increase in size of the enhancing sellar lesion to 0.7 cm (previously 0.5 cm). He met with Dr. Reyes on 2/23/24 and is referred for consideration of Gamma Knife radiosurgery.     In clinic today, he is unaccompanied. He reports having some blurry vision in the mornings that is new in the past few months; denies double vision or decreased visual fields. No HA or N/V.       Is the patient female between ages 15-55:  no    Does the patient have a CIED:  no    Prior Radiation History: None      Past Medical History:   Diagnosis Date    Arthritis     Depression     HTN (hypertension) 1/24/2017    Hyperlipidemia     Liver disease     Obesity     Pituitary adenoma     Psychiatric exam requested by authority      "Psychiatric problem     Sleep difficulties        Past Surgical History:   Procedure Laterality Date    BRAIN SURGERY  2/11/16    PITUITARY TUMMOR RESECTION/RODRIGUEZ    FINGER AMPUTATION      KNEE ARTHROPLASTY      SHOULDER ARTHROSCOPY      TONSILLECTOMY         Social History     Tobacco Use    Smoking status: Former     Current packs/day: 0.00     Types: Cigarettes    Smokeless tobacco: Never   Substance Use Topics    Alcohol use: Yes     Alcohol/week: 3.0 standard drinks of alcohol     Types: 1 Glasses of wine, 1 Cans of beer, 1 Shots of liquor per week    Drug use: No       Cancer-related family history is negative for Cancer.    Current Outpatient Medications on File Prior to Visit   Medication Sig Dispense Refill    diazePAM (VALIUM) 5 MG tablet BRING TWO TABLETS TO EACH MRI APPOINTMENT      esomeprazole (NEXIUM) 40 MG capsule       fenoprofen (NAPROFEN) 600 mg Tab       fluticasone (FLONASE) 50 mcg/actuation nasal spray SPRAY 2 SPRAYS INTO EACH NOSTRIL EVERY DAY  6    pantoprazole (PROTONIX) 40 MG tablet Take 40 mg by mouth every morning.      pulse oximeter (PULSE OXIMETER) device by Apply Externally route 2 (two) times a day. Use twice daily at 8 AM and 3 PM and record the value in MyChart as directed. (Patient not taking: Reported on 2/23/2024) 1 each 0     No current facility-administered medications on file prior to visit.       Review of patient's allergies indicates:   Allergen Reactions    Morphine Nausea And Vomiting         Vital Signs: BP (!) 162/84   Pulse 63   Ht 5' 7" (1.702 m)   Wt 128.3 kg (282 lb 13.6 oz)   SpO2 98%   BMI 44.30 kg/m²     ECOG Performance Status: (0) Fully active, able to carry on all predisease performance without restriction    Physical Exam  Constitutional:       Appearance: Normal appearance.   HENT:      Head: Normocephalic and atraumatic.   Eyes:      General: No scleral icterus.     Extraocular Movements: Extraocular movements intact.   Pulmonary:      Effort: No " accessory muscle usage or respiratory distress.   Musculoskeletal:      Cervical back: Normal range of motion.   Neurological:      Mental Status: He is alert and oriented to person, place, and time.   Psychiatric:         Mood and Affect: Mood and affect normal.         Judgment: Judgment normal.          Labs:    Imaging: I have personally reviewed the patient's available images and reports and summarized pertinent findings above in HPI.     Pathology: I have personally reviewed the patient's available pathology and summarized pertinent findings above in HPI.       - Thank you for allowing me to participate in the care of your patient.    Michael Fraser MD, PhD

## 2024-03-04 ENCOUNTER — CLINICAL SUPPORT (OUTPATIENT)
Dept: OPHTHALMOLOGY | Facility: CLINIC | Age: 64
End: 2024-03-04
Payer: COMMERCIAL

## 2024-03-04 DIAGNOSIS — H53.15 VISUAL DISTORTIONS OF SHAPE AND SIZE: Primary | ICD-10-CM

## 2024-03-07 ENCOUNTER — TELEPHONE (OUTPATIENT)
Dept: RADIATION THERAPY | Facility: HOSPITAL | Age: 64
End: 2024-03-07
Payer: COMMERCIAL

## 2024-03-07 PROCEDURE — 77470 SPECIAL RADIATION TREATMENT: CPT | Mod: 59,TC | Performed by: RADIOLOGY

## 2024-03-07 PROCEDURE — 77470 SPECIAL RADIATION TREATMENT: CPT | Mod: 26,59,, | Performed by: RADIOLOGY

## 2024-03-08 ENCOUNTER — PROCEDURE VISIT (OUTPATIENT)
Dept: RADIATION THERAPY | Facility: HOSPITAL | Age: 64
End: 2024-03-08
Attending: FAMILY MEDICINE
Payer: COMMERCIAL

## 2024-03-08 DIAGNOSIS — D35.2 PITUITARY MACROADENOMA: Primary | ICD-10-CM

## 2024-03-08 PROCEDURE — 77295 3-D RADIOTHERAPY PLAN: CPT | Mod: 26,,, | Performed by: RADIOLOGY

## 2024-03-08 PROCEDURE — 77300 RADIATION THERAPY DOSE PLAN: CPT | Mod: 26,,, | Performed by: RADIOLOGY

## 2024-03-08 PROCEDURE — 77370 RADIATION PHYSICS CONSULT: CPT | Performed by: RADIOLOGY

## 2024-03-08 PROCEDURE — 77295 3-D RADIOTHERAPY PLAN: CPT | Mod: TC | Performed by: RADIOLOGY

## 2024-03-08 PROCEDURE — 77290 THER RAD SIMULAJ FIELD CPLX: CPT | Mod: 26,,, | Performed by: RADIOLOGY

## 2024-03-08 PROCEDURE — 77300 RADIATION THERAPY DOSE PLAN: CPT | Mod: TC | Performed by: RADIOLOGY

## 2024-03-08 PROCEDURE — 77290 THER RAD SIMULAJ FIELD CPLX: CPT | Mod: TC | Performed by: RADIOLOGY

## 2024-03-08 PROCEDURE — 77263 THER RADIOLOGY TX PLNG CPLX: CPT | Mod: ,,, | Performed by: RADIOLOGY

## 2024-03-08 PROCEDURE — 61798 SRS CRANIAL LESION COMPLEX: CPT | Mod: ,,, | Performed by: NEUROLOGICAL SURGERY

## 2024-03-08 PROCEDURE — 77371 SRS MULTISOURCE: CPT | Performed by: RADIOLOGY

## 2024-03-08 PROCEDURE — 77334 RADIATION TREATMENT AID(S): CPT | Mod: 26,,, | Performed by: RADIOLOGY

## 2024-03-08 PROCEDURE — 77334 RADIATION TREATMENT AID(S): CPT | Mod: TC | Performed by: RADIOLOGY

## 2024-03-08 PROCEDURE — 77432 STEREOTACTIC RADIATION TRMT: CPT | Mod: ,,, | Performed by: RADIOLOGY

## 2024-03-08 NOTE — PROCEDURES
DATE: 3/8/2024  TIME TX STARTED: 0742  TIME TX COMPLETED: 0807  Pdady Wong  was positioned on the table a custom-shaped immobilization mask made prior to procedure.  Cone-beam CT was performed for stereotactic reference, and the images were imported into the Gamma Knife planning station. Images were co-registered with pre-planning MRI, which was used to define the target volume(s) and organs at risk.   Under direct physician supervision, acceptable localization of target and normal tissue was achieved with image guidance.    We targeted the lesion(s) with the dose(s) prescribed above to the margin of (each) lesion.    Treatment was carried out without difficulty using automated positioning.  Upon completion of the Gamma Knife procedure, the immobilization mask was removed.    Discharge instruction and FU appointments given, verbalized understanding. In NAD upon leaving with family member driving.

## 2024-03-08 NOTE — PROCEDURES
GAMMA KNIFE TREATMENT SUMMARY     NAME OF PATIENT:              Paddy Wong      DATE:                                     3/8/2024     Neurosurgeon:                      Cali Reyes M.D., Ph.D.     Radiation Oncologist:           Michael Fraser M.D.     DIAGNOSIS:                           Pituitary macroadenoma                                                                                                      Operative Procedure:    Planning of gamma radiosurgery.  Delivery of gamma radiosurgery.       Indications in Detail:    Mr. Paddy Wong is a 63 y.o. gentleman with pituitary macroadenoma, s/p transphenoidal hypophysectomy of pituitary tumor done 02/11/2016. Patient presents today for . Patient was last seen in clinic on 8/4/2020, at which time the pt stated he has been doing well in the interim, although his energy levels had been subpar. More recently, the pt reported acute blurry vision and headaches, prompting PCP to order MRI of the brain. Imaging showed pituitary recurrence. He is here today for consideration of treatment options.   MRI Brain W WO Contrast (2/20/2024): MR sella: Remote operative change transsphenoidal sellar lesion resection. Continue though slight progressively increased sized heterogeneous hypoenhancing focus within the sella measures approximately 0.7 cm craniocaudal overall concerning for worsening residual recurrent lesion. No significant mass effect on the suprasellar neurovascular structures. Clinical correlation advised. MRI brain: Stable subcentimeter extra-axial enhancing lesion overlying the left frontal lobe remains concerning for meningioma. Otherwise unremarkable MRI of the brain specifically without evidence for hydrocephalus or new parenchymal signal abnormality.  Given that there is no mass effect on the optic chiasm, I do not recommend open surgery. I recommend GKSRS for pituitary recurrence. I have discussed the risks/benefits, indications, and  alternatives for the proposed procedure in detail.      Procedure in Detail:  The patient was seen in the pretreatment area and the risks, benefits, and alternatives were discussed. The patient understood and wished to proceed. Dr. Reyes, Dr. Fraser and Dr. Sifuentes planned the radiosurgery based on a recent MRI that had been performed.  A single target was identified: Pituitary tumor.  The tumors were targeted using Gamma Plan (Lightning planned).  The treatment consisted of 14 shots using various collimators.  Dr. Fraser prescribed a dose of 18 Gy to the 49% isodose line.    The optic chiasm was deemed an avoid in structure with the max dose to 8 Gy.  The actual maximum dose to the chiasm was 7.8 Gy.   The patient had a mask that was made in used to immobilize him during the treatment.  The patient was immobilized using this mask.  The cone beam CT and plan were coregistered.  The patient was placed in the unit and the shots were delivered sequentially.  The patient was placed in the unit and the shots were delivered sequentially.  The total beam on-time was 23.9 minutes.  The patient tolerated the treatment well.      COMPLICATIONS:  None.  ESTIMATED BLOOD LOSS:  None.  DISPOSITION:  The patient is to followup in the [facility] clinic.    This plan was deemed complex secondary to increase complexity plan near the optic.           Dr. Cali Reyes MD, Ph.D.

## 2024-03-08 NOTE — PROCEDURES
Patient: Paddy Wong    MRN: [id number]    : 1960    DATE OF PROCEDURE: 3/8/2024      PRE-OPERATIVE DIAGNOSIS:  Pituitary adenoma       POST-OPERATIVE DIAGNOSIS:  Same         PROCEDURE PERFORMED:                 1)         Gamma Knife stereotactic radiosurgery to pituitary.       RADIATION ONCOLOGIST: Michael Fraser      NEUROSURGEON:  SONALI Neurosurgeon: Cali Reyes       MEDICAL PHYSICIST: SONALI Medical Physicist: Unruly Sifuentes       PROCEDURE SUMMARY:    Target # Site Dose per Fraction (Gy) Cumulative Dose % Isodose Line Total Fractions   1 Pituitary adenoma 18 18 49 1       INDICATIONS AND CONSENT:  Paddy Wong is a 63 y.o. male with h/o gonadotroph adenoma s/p resection by Dr. Reyes 16 with local recurrence in the sella noted on MRI Brain 24. It was recommended that he undergo Gamma Knife stereotactic radiosurgery.  The risks of this procedure as well as possible complications were discussed with the patient pre-operatively.       DESCRIPTION OF PROCEDURE:  On the day of the procedure, the patient was positioned on the table in a custom-shaped immobilization mask.  Cone-beam CT was performed for stereotactic reference, and the images were imported into the Gamma Knife planning station. Images were co-registered with pre-planning MRI, which was used to define the target volume(s) and organs at risk. Under direct physician supervision, acceptable localization of target and normal tissue was achieved with image guidance.  We targeted the lesion(s) with the dose(s) prescribed above to the margin of (each) lesion.  Treatment was carried out without difficulty using automated positioning.  Upon completion of the Gamma Knife procedure, the immobilization mask was removed.

## 2024-03-08 NOTE — PATIENT INSTRUCTIONS
After Gamma Knife Treatment    You may return to your normal activities as you feel up to doing them usually 1-2 days after treatment.    Do not drive the day of your treatment.    Feeling tired is normal after therapy: Rest as needed, eat healthy and drink plenty fluids     Some patients get headaches from wearing the mask:  you can take Tylenol or Advil when you need to     When should I call the doctor?    Call your doctor right away if you have any of the following:   Seizures   A persistent severe headache: may be caused by swelling in the treated area of your brain. We often treat with a medication called Decadron to reduce the swelling    Vision changes    Nausea and/or vomiting    Numbness in your face, arms and/or legs   Weakness  Loss of balance       What follow-up care will I have?       The results of the Gamma Knife treatment do not happen right away.   They may show up over a period of time. You will have scans (CT, MRI   or angiography) done again to measure the success of the treatment.     The first scan is normally done about 2- 3 months after the treatment.   How often these scans will be done depends on your diagnosis.     It is very important to keep your follow up appointments with the   radiation oncologist and neurosurgeon. You will be given information   with the date, time and location for these appointments.  Who do I call if I have questions?     If you have questions about your Gamma Knife treatment :     The [facility] Radiation Oncology Department- Gamma Knife Suite at [phone].     To reach the neurosurgeon in the evenings or weekends, call the   hospital  at [phone] and ask for the neurosurgeon on   call.

## 2024-03-11 ENCOUNTER — TELEPHONE (OUTPATIENT)
Dept: RADIATION THERAPY | Facility: HOSPITAL | Age: 64
End: 2024-03-11
Payer: COMMERCIAL

## 2024-03-11 PROCEDURE — 77336 RADIATION PHYSICS CONSULT: CPT | Performed by: RADIOLOGY

## 2024-03-11 NOTE — TELEPHONE ENCOUNTER
Paddy Wong  called post GK procedure, states doing finecruzw, sttes day of procedure he was sleepy and had an upset stomach but he is good now.

## 2024-03-12 ENCOUNTER — TELEPHONE (OUTPATIENT)
Dept: OPHTHALMOLOGY | Facility: CLINIC | Age: 64
End: 2024-03-12
Payer: COMMERCIAL

## 2024-03-12 NOTE — TELEPHONE ENCOUNTER
----- Message from Frieda Thompson MA sent at 3/11/2024  5:02 PM CDT -----  Regarding: FW: Questions  Good afternoon    Can you review the pts VF test from 03/04 and let us know what's next. The pt has an appt in July with you.     Thanks  Frieda   ----- Message -----  From: Andressa Jarquin  Sent: 3/11/2024   1:37 PM CDT  To: [facility] Staff  Subject: Questions                                            Name Of Caller:    Paddy      Contact Preference:   [phone]       Nature of call:    Pt would like speak with a tech about the Hvf results from 03/04. He wants to know what steps to take next.

## 2024-06-03 ENCOUNTER — PATIENT MESSAGE (OUTPATIENT)
Dept: NEUROSURGERY | Facility: CLINIC | Age: 64
End: 2024-06-03
Payer: COMMERCIAL

## 2024-07-12 ENCOUNTER — TELEPHONE (OUTPATIENT)
Dept: OPHTHALMOLOGY | Facility: CLINIC | Age: 64
End: 2024-07-12
Payer: COMMERCIAL

## 2024-07-12 NOTE — TELEPHONE ENCOUNTER
----- Message from Casie Wells sent at 7/12/2024  9:52 AM CDT -----  Regarding: Returning a Missed Call  Contact: Paddy Wong  Returning a Missed Call    Caller: Paddy Wong       Returning call to: [facility]       Caller can be reached @: [phone] (home)      Nature of the call: Patient returning call to [facility] regarding scheduling an appt. Requesting a call back.

## 2024-07-12 NOTE — TELEPHONE ENCOUNTER
----- Message from Unruly Washington sent at 7/3/2024  9:58 AM CDT -----  Contact: [phone]  Spoke with Mr. Wong and told him you would be in contact with him about rescheduling his 7/10 appointment with a visual field.  ----- Message -----  From: Andree Rhodes  Sent: 7/3/2024   9:54 AM CDT  To: [facility] Staff    Patient is calling to reschedule appointment going out of town. Please call to assist.

## 2024-07-15 ENCOUNTER — HOSPITAL ENCOUNTER (OUTPATIENT)
Dept: RADIOLOGY | Facility: HOSPITAL | Age: 64
Discharge: HOME | End: 2024-07-15
Attending: NEUROLOGICAL SURGERY
Payer: COMMERCIAL

## 2024-07-15 DIAGNOSIS — D35.2 PITUITARY MACROADENOMA: ICD-10-CM

## 2024-07-15 PROCEDURE — 25500020 PHARM REV CODE 255: Performed by: NEUROLOGICAL SURGERY

## 2024-07-15 PROCEDURE — 70553 MRI BRAIN STEM W/O & W/DYE: CPT | Mod: 26,,, | Performed by: RADIOLOGY

## 2024-07-15 PROCEDURE — 70553 MRI BRAIN STEM W/O & W/DYE: CPT | Mod: TC

## 2024-07-15 PROCEDURE — A9585 GADOBUTROL INJECTION: HCPCS | Performed by: NEUROLOGICAL SURGERY

## 2024-07-15 RX ORDER — GADOBUTROL 604.72 MG/ML
5 INJECTION INTRAVENOUS
Status: COMPLETED | OUTPATIENT
Start: 2024-07-15 | End: 2024-07-15

## 2024-07-15 RX ADMIN — GADOBUTROL 5 ML: 604.72 INJECTION INTRAVENOUS at 09:07

## 2024-07-16 ENCOUNTER — OFFICE VISIT (OUTPATIENT)
Dept: NEUROSURGERY | Facility: CLINIC | Age: 64
End: 2024-07-16
Payer: COMMERCIAL

## 2024-07-16 DIAGNOSIS — E89.3 S/P TRANSSPHENOIDAL HYPOPHYSECTOMY: ICD-10-CM

## 2024-07-16 DIAGNOSIS — D35.2 PITUITARY MACROADENOMA: Primary | ICD-10-CM

## 2024-07-16 PROCEDURE — 99214 OFFICE O/P EST MOD 30 MIN: CPT | Mod: S$GLB,,, | Performed by: NEUROLOGICAL SURGERY

## 2024-07-16 PROCEDURE — 1159F MED LIST DOCD IN RCRD: CPT | Mod: CPTII,S$GLB,, | Performed by: NEUROLOGICAL SURGERY

## 2024-07-16 PROCEDURE — 1160F RVW MEDS BY RX/DR IN RCRD: CPT | Mod: CPTII,S$GLB,, | Performed by: NEUROLOGICAL SURGERY

## 2024-07-16 PROCEDURE — 99999 PR PBB SHADOW E&M-EST. PATIENT-LVL I: CPT | Mod: PBBFAC,,, | Performed by: NEUROLOGICAL SURGERY

## 2024-07-16 NOTE — PROGRESS NOTES
Subjective:   I, Flash Mckeon, attest that this documentation has been prepared under the direction and in the presence of Cali Reyes MD.     Patient ID: Paddy Wong is a 63 y.o. male     Chief Complaint: No chief complaint on file.      HPI  Mr. Paddy Wong is a 63 y.o. gentleman with pituitary macroadenoma, s/p transsphenoidal hypophysectomy of pituitary tumor done 02/11/2016. Patient presents today for his 3mo f/u visit s/p GK done on 3/8/24. He received 18Gy to the 49% isodose line with a cumulative dose of 18 at the pituitary adenoma site. Patient was seen in clinic on 8/4/2020, at which time the pt stated he has been doing well in the interim, although his energy levels had been subpar. More recently, the pt reported acute blurry vision and headaches, prompting PCP to order MRI of the brain. Imaging showed pituitary recurrence. He is here today for consideration of treatment options.   MRI Brain W WO Contrast (2/20/2024): MR sella: Remote operative change transsphenoidal sellar lesion resection. Continue though slight progressively increased sized heterogeneous hypoenhancing focus within the sella measures approximately 0.7 cm craniocaudal overall concerning for worsening residual recurrent lesion. No significant mass effect on the suprasellar neurovascular structures. Clinical correlation advised. MRI brain: Stable subcentimeter extra-axial enhancing lesion overlying the left frontal lobe remains concerning for meningioma. Otherwise unremarkable MRI of the brain specifically without evidence for hydrocephalus or new parenchymal signal abnormality.  Given that there is no mass effect on the optic chiasm, I do not recommend open surgery. I recommend GKSRS for pituitary recurrence. I have discussed the risks/benefits, indications, and alternatives for the proposed procedure in detail.     Today the pt reports he is doing well s/p radiosurgery. He started playing pickleball 6 days a week and has  lost over 35lbs. He mentions that he hit his head on the floor while playing pickle ball but sustained no lasting symptoms. He has no new neurological complaints or concerns at this time.      Review of Systems   Constitutional:  Negative for activity change, appetite change, fatigue, fever and unexpected weight change.   HENT:  Negative for facial swelling.    Eyes: Negative.    Respiratory: Negative.     Cardiovascular: Negative.    Gastrointestinal:  Negative for diarrhea, nausea and vomiting.   Endocrine: Negative.    Genitourinary: Negative.    Musculoskeletal:  Negative for back pain, joint swelling, myalgias and neck pain.   Neurological:  Negative for dizziness, seizures, weakness, numbness and headaches.   Psychiatric/Behavioral: Negative.          Past Medical History:   Diagnosis Date    Arthritis     Depression     HTN (hypertension) 1/24/2017    Hyperlipidemia     Liver disease     Obesity     Pituitary adenoma     Psychiatric exam requested by authority     Psychiatric problem     Sleep difficulties        Objective:    There were no vitals filed for this visit.   Physical Exam  Constitutional:       General: He is not in acute distress.     Appearance: Normal appearance.   HENT:      Head: Normocephalic and atraumatic.   Pulmonary:      Effort: Pulmonary effort is normal.   Musculoskeletal:      Cervical back: Neck supple.   Neurological:      Mental Status: He is alert and oriented to person, place, and time.      GCS: GCS eye subscore is 4. GCS verbal subscore is 5. GCS motor subscore is 6.      Cranial Nerves: No cranial nerve deficit.       IMAGING:  MRI PITUITARY W WO CONTRAST 7/15/24:  Heterogeneous soft tissue right side of the sella suggestive of residual/recurrent lesion however stable from the prior study.  No encroachment on the optic chiasm. No new intracranial process.    I have personally reviewed the images with the pt.      I, Dr. Cali Reyes, personally performed the services  described in this documentation. All medical record entries made by the scribe, Flash Mckeon, were at my direction and in my presence.  I have reviewed the chart and agree that the record reflects my personal performance and is accurate and complete. Cali Reyes MD. 07/16/2024    Assessment:       Pituitary adenoma.      Plan:   I have personally reviewed the MRI PITUITARY W WO CONTRAST  with the pt which shows heterogeneous soft tissue right side of the sella suggestive of residual/recurrent lesion however stable from the prior study.  No encroachment on the optic chiasm. No new intracranial process.    I will schedule the patient for a f/u visit in 1yr with MRI pituitary W WO contrast.

## 2024-07-16 NOTE — PATIENT INSTRUCTIONS
I have personally reviewed the MRI PITUITARY W WO CONTRAST  with the pt which shows heterogeneous soft tissue right side of the sella suggestive of residual/recurrent lesion however stable from the prior study.  No encroachment on the optic chiasm. No new intracranial process.    I will schedule the patient for a f/u visit in 1yr with MRI pituitary W WO contrast.

## 2024-09-11 ENCOUNTER — PATIENT OUTREACH (OUTPATIENT)
Dept: ADMINISTRATIVE | Facility: HOSPITAL | Age: 64
End: 2024-09-11
Payer: COMMERCIAL

## 2024-09-11 NOTE — PROGRESS NOTES
Health Maintenance Topic(s) Outreach Outcomes & Actions Taken:    Lab(s) - Outreach Outcomes & Actions Taken  : External Records Uploaded & Care Team Updated if Applicable

## 2024-10-15 NOTE — PROGRESS NOTES
Subjective:       Patient ID: Paddy Wong is a 57 y.o. male.    Chief Complaint: Consult    CC: Weight    Current attempts at weight loss: New pt to me, referred by Domingo Lee MD  [id number]  [place] [address] , with Patient Active Problem List:     Pituitary macroadenoma s/p resection     S/P transsphenoidal hypophysectomy     Hypogonadotropic hypogonadism     Morbid obesity due to excess calories     HTN (hypertension)     Hiatal hernia     GERD (gastroesophageal reflux disease)     Fatty liver     Suspected LILIYA during surgery. No PSG.-Sleeps on side.      Lab Results       Component                Value               Date                       ALT                      41                  06/26/2018                 AST                      29                  06/26/2018                 ALKPHOS                  88                  06/26/2018                 BILITOT                  0.9                 06/26/2018                Walks on occasion. Cutting back on ETOH.     Previous diet attempts: Denies.     History of medication for loss: Denies. .  checked today     Heaviest weight: 304#    Lightest weight: 167#    Goal weight: 210#      Last eye exam:   June. No gluacoma                                Provider: Dr. Cloud    Typical eating patterns: Works for [employer]. [id number] job. LIves with sister. Sister does cooking.    Breakfast: Instant grits. Protein shake Weekends: skips.     Lunch: Healthy choice or [place] callendar frozen dinner. Once a week goes out- chinese, pizza, poboy. Weekends: sandwich, leftovers, sushi.     Dinner: poboy and paneed meat. Meat, veg and starch.     Snacks: dark chocolate or ice cream.     Beverages: Water, caryl. ETOH- 4 beers or cocktails 2 times a week.     Willingness to change: 7/10    EKG:    BMR: 2182      Review of Systems   Constitutional: Positive for fatigue. Negative for chills and fever.   Eyes: Positive for pain.   Respiratory: Negative for shortness of  "breath.         + snores on back   Cardiovascular: Negative for chest pain and leg swelling.   Gastrointestinal: Negative for constipation and diarrhea.        + GERD   Genitourinary: Negative for difficulty urinating and dysuria.   Musculoskeletal: Positive for arthralgias. Negative for back pain.   Neurological: Negative for dizziness and light-headedness.   Psychiatric/Behavioral: Negative for dysphoric mood. The patient is not nervous/anxious.        Objective:     /66   Pulse 76   Ht 5' 8" (1.727 m)   Wt (!) 138.3 kg (304 lb 14.3 oz)   BMI 46.36 kg/m²     Physical Exam   Constitutional: He is oriented to person, place, and time. He appears well-developed. No distress.   Morbidly obese     HENT:   Head: Normocephalic and atraumatic.   Eyes: Pupils are equal, round, and reactive to light. No scleral icterus.   Neck: Normal range of motion. Neck supple. No thyromegaly present.   Cardiovascular: Normal rate, regular rhythm and normal heart sounds. Exam reveals no gallop and no friction rub.   No murmur heard.  Pulmonary/Chest: Effort normal and breath sounds normal. No respiratory distress. He has no wheezes.   Abdominal: Soft. Bowel sounds are normal. He exhibits no distension. There is no tenderness.   Musculoskeletal: Normal range of motion. He exhibits no edema.   Neurological: He is alert and oriented to person, place, and time.   Skin: Skin is warm and dry.   Psychiatric: He has a normal mood and affect. His behavior is normal. Judgment normal.   Vitals reviewed.      Assessment:       1. Class 3 severe obesity due to excess calories with serious comorbidity and body mass index (BMI) of 45.0 to 49.9 in adult    2. Witnessed episode of apnea    3. Fatty liver    4. Gastroesophageal reflux disease, esophagitis presence not specified    5. Essential hypertension    6. IFG (impaired fasting glucose)        Plan:         1. Class 3 severe obesity due to excess calories with serious comorbidity and body " mass index (BMI) of 45.0 to 49.9 in adult    - phentermine (ADIPEX-P) 37.5 mg tablet; Take 1 tablet (37.5 mg total) by mouth before breakfast.  Dispense: 30 tablet; Refill: 2  Patient warned of common side effects of phentermine including anxiety, insomnia, palpitations and increased blood pressure. It was also explained that it is for short-term usage along with diet and exercise, and that stopping the medication without making lifestyle changes will result in regain of weight. Patient states understanding.     Weight loss medications are controlled substances.  They require routine follow up. Prescription or pills that are lost or destroyed will not be replaced.       Start phentermine with 1/2 pill a day for at least 1 week to see if that will control your appetite.  Go up to a full pill when needed.       Limit alcohol to 4 drinks per week.        Exercise 30 min 3 days a week. Gradually increase.     Patient counseled in strategies for long term weight loss and maintenance: Keeping a food diary, exercise for 1 hour a day and eating breakfast everyday.       3 meals a day made up of the following:  Unlimited green vegetables, tomatoes, mushrooms, spaghetti squash, cauliflower, meat, poultry, seafood, eggs and hard cheeses.   Milk and plain yogurt  Dressings, seasonings, condiments, etc should have less than 2 g sugars.   Beans (1-1.5 cups) or nuts (1/4 cup) can have 1 x a day.   1-2 servings of citrus fruits, berries, pineapple or melon a day (1/2 cup)  Avoid fried foods    No grains, rice, pasta, potatoes, bread, corn, peas, oatmeal, grits, tortillas, crackers, chips    No soda, sweet tea, juices or lemonade    [email or web address] for recipes. Moderate carb intake    Meal ideas given.   2. Witnessed episode of apnea  Will message Dr. Chaves as to how she would prefer to get this pt back to care.   - Ambulatory consult to Sleep Disorders    3. Fatty liver  Discussed with patient that the only treatment for fatty  liver is to lose weight.  Without doing so, it may progress to cirrhosis, permanent liver damage and possibly liver failure. Expect improvement with weight loss.      4. Gastroesophageal reflux disease, esophagitis presence not specified  Expect improvement with weight loss  Attempt to wean PPI once 10% TBW lost.    5. Essential hypertension  The current medical regimen is effective;  continue present plan and medications. Expect improvement with weight loss.     6. IFG (impaired fasting glucose)  Discussed decreasing the risks of diabetes with changes in diet, routine exercise and losing weight.  Could also consider metformin.       > LDSS ACHS  -a1c 6.1

## 2025-02-20 ENCOUNTER — TELEPHONE (OUTPATIENT)
Dept: NEUROSURGERY | Facility: CLINIC | Age: 65
End: 2025-02-20
Payer: COMMERCIAL

## 2025-02-20 DIAGNOSIS — D35.2 PITUITARY MACROADENOMA: Primary | ICD-10-CM

## 2025-02-20 NOTE — TELEPHONE ENCOUNTER
----- Message from Nicki sent at 2/17/2025  3:07 PM CST -----  Pt calling to schedule his follow up for July also needs MRI orders put in for  petey Confirmed patient's contact info below:Contact Name: Paddy WongPhone Number: [phone]

## 2025-07-22 ENCOUNTER — OFFICE VISIT (OUTPATIENT)
Dept: NEUROSURGERY | Facility: CLINIC | Age: 65
End: 2025-07-22
Payer: COMMERCIAL

## 2025-07-22 ENCOUNTER — HOSPITAL ENCOUNTER (OUTPATIENT)
Dept: RADIOLOGY | Facility: HOSPITAL | Age: 65
Discharge: HOME | End: 2025-07-22
Attending: NEUROLOGICAL SURGERY
Payer: COMMERCIAL

## 2025-07-22 DIAGNOSIS — D35.2 PITUITARY MACROADENOMA: ICD-10-CM

## 2025-07-22 DIAGNOSIS — E89.3 S/P TRANSSPHENOIDAL HYPOPHYSECTOMY: ICD-10-CM

## 2025-07-22 DIAGNOSIS — D35.2 PITUITARY MACROADENOMA: Primary | ICD-10-CM

## 2025-07-22 PROCEDURE — A9585 GADOBUTROL INJECTION: HCPCS | Performed by: NEUROLOGICAL SURGERY

## 2025-07-22 PROCEDURE — 25500020 PHARM REV CODE 255: Performed by: NEUROLOGICAL SURGERY

## 2025-07-22 PROCEDURE — 1160F RVW MEDS BY RX/DR IN RCRD: CPT | Mod: CPTII,S$GLB,, | Performed by: NEUROLOGICAL SURGERY

## 2025-07-22 PROCEDURE — 99999 PR PBB SHADOW E&M-EST. PATIENT-LVL II: CPT | Mod: PBBFAC,,, | Performed by: NEUROLOGICAL SURGERY

## 2025-07-22 PROCEDURE — 70553 MRI BRAIN STEM W/O & W/DYE: CPT | Mod: TC

## 2025-07-22 PROCEDURE — 1159F MED LIST DOCD IN RCRD: CPT | Mod: CPTII,S$GLB,, | Performed by: NEUROLOGICAL SURGERY

## 2025-07-22 PROCEDURE — 99214 OFFICE O/P EST MOD 30 MIN: CPT | Mod: S$GLB,,, | Performed by: NEUROLOGICAL SURGERY

## 2025-07-22 PROCEDURE — 70553 MRI BRAIN STEM W/O & W/DYE: CPT | Mod: 26,,, | Performed by: RADIOLOGY

## 2025-07-22 RX ORDER — GADOBUTROL 604.72 MG/ML
5 INJECTION INTRAVENOUS
Status: COMPLETED | OUTPATIENT
Start: 2025-07-22 | End: 2025-07-22

## 2025-07-22 RX ADMIN — GADOBUTROL 5 ML: 604.72 INJECTION INTRAVENOUS at 08:07

## 2025-07-22 NOTE — PROGRESS NOTES
Subjective:   I, Paddy Escobedo, attest that this documentation has been prepared under the direction and in the presence of Cali Reyes MD.     Patient ID: Paddy Wong is a 64 y.o. male     Chief Complaint: 1 year management evaluation follow up for pituitary with MRI done today    HPI  Mr. Paddy Wong is a 64 y.o. gentleman with pituitary macroadenoma, s/p transsphenoidal hypophysectomy of pituitary tumor done 02/11/2016 who presents today for his subsequent 1 year management evaluation f/u visit s/p GK done on 3/8/24. He received 18Gy to the 49% isodose line with a cumulative dose of 18 at the pituitary adenoma site. Patient was previously seen in clinic on 7/16/2024, where the patient reported he was doing well s/p radiosurgery. He started playing pickle ball frequently at the time and reported that he had lost over 35 lbs. He also mentioned that he hit his head on the floor while playing pickleball, but had not sustained any lasting symptoms. He had no new neurological complaints or concerns that the time. I had scheduled a 1 year follow up and MRI Pituitary to be obtained today.    Today the pt reports that he has been recently been keeping active with pickleball 6 days a week. He mentions that he enjoys this as it is a good way to lose weight. Patient and his partner are primarily present today to follow up and discuss MRI Pituitary W WO Contrast from today, which had shown stable heterogeneous hypoenhancing lesion. There is no increased size to the lesion to suggest any lesion progression. No other acute changes or issues to report.    Review of Systems   Constitutional:  Negative for activity change, appetite change, fatigue, fever and unexpected weight change.   HENT:  Negative for facial swelling.    Eyes: Negative.    Respiratory: Negative.     Cardiovascular: Negative.    Gastrointestinal:  Negative for diarrhea, nausea and vomiting.   Endocrine: Negative.    Genitourinary: Negative.     Musculoskeletal:  Negative for back pain, joint swelling, myalgias and neck pain.   Neurological:  Negative for dizziness, seizures, weakness, numbness and headaches.   Psychiatric/Behavioral: Negative.          Past Medical History:   Diagnosis Date    Arthritis     Depression     HTN (hypertension) 1/24/2017    Hyperlipidemia     Liver disease     Obesity     Pituitary adenoma     Psychiatric exam requested by authority     Psychiatric problem     Sleep difficulties        Objective:    There were no vitals filed for this visit.   Physical Exam  Constitutional:       General: He is not in acute distress.     Appearance: Normal appearance.   HENT:      Head: Normocephalic and atraumatic.   Pulmonary:      Effort: Pulmonary effort is normal.   Musculoskeletal:      Cervical back: Neck supple.   Neurological:      Mental Status: He is alert and oriented to person, place, and time.      GCS: GCS eye subscore is 4. GCS verbal subscore is 5. GCS motor subscore is 6.      Cranial Nerves: No cranial nerve deficit.       IMAGING:  MRI PITUITARY W WO CONTRAST 7/22/2025  MR sella: Continue although relatively stable heterogeneous hypoenhancing lesion right aspect the adenohypophysis which may represent residual/recurrent lesion.  No increased sized lesion to suggest lesion progression.      Otherwise unremarkable MRI of the brain specifically without evidence for hydrocephalus or new parenchymal signal abnormality     Subcentimeter enhancing extra-axial lesion overlies the left parasagittal frontal lobe most compatible with meningioma unchanged.    I have personally reviewed the images with the pt.      I, Dr. Cali Reyes, personally performed the services described in this documentation. All medical record entries made by the scribe, Paddy Escobedo, were at my direction and in my presence.  I have reviewed the chart and agree that the record reflects my personal performance and is accurate and complete. Cali Reyes MD.  7/22/2025    Assessment:       Pituitary adenoma.      Plan:   I have personally reviewed the MRI PITUITARY W WO CONTRAST (7/22/2025) which shows MR sella: Continue although relatively stable heterogeneous hypoenhancing lesion right aspect the adenohypophysis which may represent residual/recurrent lesion. No increased sized lesion to suggest lesion progression. Otherwise unremarkable MRI of the brain specifically without evidence for hydrocephalus or new parenchymal signal abnormality Subcentimeter enhancing extra-axial lesion overlies the left parasagittal frontal lobe most compatible with meningioma unchanged.     Given the analysis of the imaging indicating no evidence of an increase in size of lesion, I will schedule the patient for a f/u visit in 1 yr with repeat MRI pituitary W WO contrast.